# Patient Record
Sex: FEMALE | Race: WHITE | Employment: FULL TIME | URBAN - METROPOLITAN AREA
[De-identification: names, ages, dates, MRNs, and addresses within clinical notes are randomized per-mention and may not be internally consistent; named-entity substitution may affect disease eponyms.]

---

## 2017-09-13 ENCOUNTER — OFFICE VISIT (OUTPATIENT)
Dept: INTERNAL MEDICINE CLINIC | Age: 37
End: 2017-09-13

## 2017-09-13 VITALS
HEART RATE: 94 BPM | OXYGEN SATURATION: 97 % | WEIGHT: 164 LBS | DIASTOLIC BLOOD PRESSURE: 62 MMHG | SYSTOLIC BLOOD PRESSURE: 100 MMHG | RESPIRATION RATE: 18 BRPM | TEMPERATURE: 98.7 F | HEIGHT: 61 IN | BODY MASS INDEX: 30.96 KG/M2

## 2017-09-13 DIAGNOSIS — J20.9 ACUTE BRONCHITIS, UNSPECIFIED ORGANISM: Primary | ICD-10-CM

## 2017-09-13 PROCEDURE — 99213 OFFICE O/P EST LOW 20 MIN: CPT | Performed by: FAMILY MEDICINE

## 2017-09-13 RX ORDER — ALBUTEROL SULFATE 90 UG/1
2 AEROSOL, METERED RESPIRATORY (INHALATION) EVERY 6 HOURS PRN
Qty: 1 INHALER | Refills: 3 | Status: SHIPPED | OUTPATIENT
Start: 2017-09-13 | End: 2020-01-17 | Stop reason: ALTCHOICE

## 2017-09-13 RX ORDER — AMOXICILLIN 875 MG/1
875 TABLET, COATED ORAL 2 TIMES DAILY
Qty: 20 TABLET | Refills: 0 | Status: SHIPPED | OUTPATIENT
Start: 2017-09-13 | End: 2017-09-23

## 2017-09-13 RX ORDER — CETIRIZINE HYDROCHLORIDE, PSEUDOEPHEDRINE HYDROCHLORIDE 5; 120 MG/1; MG/1
1 TABLET, FILM COATED, EXTENDED RELEASE ORAL 2 TIMES DAILY
Qty: 30 TABLET | Refills: 0 | Status: SHIPPED | OUTPATIENT
Start: 2017-09-13 | End: 2017-09-28

## 2017-09-13 RX ORDER — BENZONATATE 100 MG/1
100 CAPSULE ORAL 3 TIMES DAILY PRN
Qty: 30 CAPSULE | Refills: 0 | Status: SHIPPED | OUTPATIENT
Start: 2017-09-13 | End: 2017-09-23

## 2017-09-13 ASSESSMENT — ENCOUNTER SYMPTOMS
SINUS PRESSURE: 1
VOMITING: 0
COUGH: 1
SORE THROAT: 1
DIARRHEA: 0
NAUSEA: 0
SHORTNESS OF BREATH: 1

## 2018-03-02 LAB
C TRACH DNA GENITAL QL NAA+PROBE: NEGATIVE
CHLAMYDIA TRACHOMATIS AMPLIFIED DET: NORMAL

## 2018-03-15 LAB
HEPATITIS B SURFACE ANTIGEN INTERPRETATION: NEGATIVE
HIV-1 AND HIV-2 ANTIBODIES: NEGATIVE
RUBELLA ANTIBODY IGG: NORMAL

## 2018-08-15 LAB — RPR: NORMAL

## 2018-08-30 ENCOUNTER — HOSPITAL ENCOUNTER (OUTPATIENT)
Dept: OTHER | Age: 38
Discharge: HOME | End: 2018-09-06
Attending: OBSTETRICS & GYNECOLOGY | Admitting: OBSTETRICS & GYNECOLOGY

## 2018-10-07 ENCOUNTER — HOSPITAL ENCOUNTER (INPATIENT)
Age: 38
LOS: 2 days | Discharge: HOME | End: 2018-10-09
Attending: OBSTETRICS & GYNECOLOGY | Admitting: OBSTETRICS & GYNECOLOGY
Payer: COMMERCIAL

## 2018-10-07 ENCOUNTER — HOSPITAL ENCOUNTER (OUTPATIENT)
Age: 38
Setting detail: OBSERVATION
Discharge: HOME | End: 2018-10-07
Attending: OBSTETRICS & GYNECOLOGY | Admitting: OBSTETRICS & GYNECOLOGY
Payer: COMMERCIAL

## 2018-10-07 VITALS
HEART RATE: 72 BPM | SYSTOLIC BLOOD PRESSURE: 130 MMHG | WEIGHT: 191 LBS | RESPIRATION RATE: 18 BRPM | TEMPERATURE: 98.2 F | HEIGHT: 61 IN | BODY MASS INDEX: 36.06 KG/M2 | DIASTOLIC BLOOD PRESSURE: 76 MMHG

## 2018-10-07 LAB
ABO/RH: NORMAL
AMPHETAMINE SCREEN, URINE: NORMAL
ANTIBODY SCREEN: NORMAL
BARBITURATE SCREEN URINE: NORMAL
BENZODIAZEPINE SCREEN, URINE: NORMAL
BUPRENORPHINE URINE: NORMAL
CANNABINOID SCREEN URINE: NORMAL
COCAINE METABOLITE SCREEN URINE: NORMAL
HCT VFR BLD CALC: 39.5 % (ref 36–48)
HEMOGLOBIN: 13.3 G/DL (ref 12–16)
Lab: NORMAL
MCH RBC QN AUTO: 30.1 PG (ref 26–34)
MCHC RBC AUTO-ENTMCNC: 33.7 G/DL (ref 31–36)
MCV RBC AUTO: 89.4 FL (ref 80–100)
METHADONE SCREEN, URINE: NORMAL
OPIATE SCREEN URINE: NORMAL
OXYCODONE URINE: NORMAL
PDW BLD-RTO: 13.5 % (ref 12.4–15.4)
PH UA: 5
PHENCYCLIDINE SCREEN URINE: NORMAL
PLATELET # BLD: 202 K/UL (ref 135–450)
PMV BLD AUTO: 8.7 FL (ref 5–10.5)
PROPOXYPHENE SCREEN: NORMAL
RBC # BLD: 4.42 M/UL (ref 4–5.2)
WBC # BLD: 11.9 K/UL (ref 4–11)

## 2018-10-07 PROCEDURE — 86780 TREPONEMA PALLIDUM: CPT

## 2018-10-07 PROCEDURE — 1220000000 HC SEMI PRIVATE OB R&B

## 2018-10-07 PROCEDURE — G0379 DIRECT REFER HOSPITAL OBSERV: HCPCS

## 2018-10-07 PROCEDURE — G0378 HOSPITAL OBSERVATION PER HR: HCPCS

## 2018-10-07 PROCEDURE — 96365 THER/PROPH/DIAG IV INF INIT: CPT

## 2018-10-07 PROCEDURE — 85027 COMPLETE CBC AUTOMATED: CPT

## 2018-10-07 PROCEDURE — 99999 PR OFFICE/OUTPT VISIT,PROCEDURE ONLY: CPT | Performed by: OBSTETRICS & GYNECOLOGY

## 2018-10-07 PROCEDURE — 59025 FETAL NON-STRESS TEST: CPT

## 2018-10-07 PROCEDURE — 0KQM0ZZ REPAIR PERINEUM MUSCLE, OPEN APPROACH: ICD-10-PCS | Performed by: OBSTETRICS & GYNECOLOGY

## 2018-10-07 PROCEDURE — 6360000002 HC RX W HCPCS: Performed by: OBSTETRICS & GYNECOLOGY

## 2018-10-07 PROCEDURE — 86850 RBC ANTIBODY SCREEN: CPT

## 2018-10-07 PROCEDURE — 2500000003 HC RX 250 WO HCPCS

## 2018-10-07 PROCEDURE — 80307 DRUG TEST PRSMV CHEM ANLYZR: CPT

## 2018-10-07 PROCEDURE — 2580000003 HC RX 258: Performed by: OBSTETRICS & GYNECOLOGY

## 2018-10-07 PROCEDURE — 86900 BLOOD TYPING SEROLOGIC ABO: CPT

## 2018-10-07 PROCEDURE — 86901 BLOOD TYPING SEROLOGIC RH(D): CPT

## 2018-10-07 PROCEDURE — 99205 OFFICE O/P NEW HI 60 MIN: CPT

## 2018-10-07 RX ORDER — LIDOCAINE HYDROCHLORIDE 10 MG/ML
INJECTION, SOLUTION INFILTRATION; PERINEURAL
Status: COMPLETED
Start: 2018-10-07 | End: 2018-10-07

## 2018-10-07 RX ORDER — SODIUM CHLORIDE 0.9 % (FLUSH) 0.9 %
10 SYRINGE (ML) INJECTION PRN
Status: DISCONTINUED | OUTPATIENT
Start: 2018-10-07 | End: 2018-10-08 | Stop reason: HOSPADM

## 2018-10-07 RX ORDER — SODIUM CHLORIDE, SODIUM LACTATE, POTASSIUM CHLORIDE, CALCIUM CHLORIDE 600; 310; 30; 20 MG/100ML; MG/100ML; MG/100ML; MG/100ML
INJECTION, SOLUTION INTRAVENOUS CONTINUOUS
Status: DISCONTINUED | OUTPATIENT
Start: 2018-10-07 | End: 2018-10-09 | Stop reason: HOSPADM

## 2018-10-07 RX ORDER — SODIUM CHLORIDE 0.9 % (FLUSH) 0.9 %
10 SYRINGE (ML) INJECTION EVERY 12 HOURS SCHEDULED
Status: DISCONTINUED | OUTPATIENT
Start: 2018-10-07 | End: 2018-10-08 | Stop reason: HOSPADM

## 2018-10-07 RX ORDER — ONDANSETRON 2 MG/ML
4 INJECTION INTRAMUSCULAR; INTRAVENOUS EVERY 6 HOURS PRN
Status: DISCONTINUED | OUTPATIENT
Start: 2018-10-07 | End: 2018-10-08 | Stop reason: HOSPADM

## 2018-10-07 RX ORDER — DOCUSATE SODIUM 100 MG/1
100 CAPSULE, LIQUID FILLED ORAL 2 TIMES DAILY
Status: DISCONTINUED | OUTPATIENT
Start: 2018-10-07 | End: 2018-10-08 | Stop reason: HOSPADM

## 2018-10-07 RX ADMIN — DEXTROSE MONOHYDRATE 5 MILLION UNITS: 5 INJECTION INTRAVENOUS at 19:52

## 2018-10-07 RX ADMIN — SODIUM CHLORIDE, POTASSIUM CHLORIDE, SODIUM LACTATE AND CALCIUM CHLORIDE: 600; 310; 30; 20 INJECTION, SOLUTION INTRAVENOUS at 20:54

## 2018-10-07 RX ADMIN — Medication 1 MILLI-UNITS/MIN: at 21:00

## 2018-10-07 RX ADMIN — SODIUM CHLORIDE, POTASSIUM CHLORIDE, SODIUM LACTATE AND CALCIUM CHLORIDE: 600; 310; 30; 20 INJECTION, SOLUTION INTRAVENOUS at 19:21

## 2018-10-07 RX ADMIN — LIDOCAINE HYDROCHLORIDE 200 MG: 10 INJECTION, SOLUTION INFILTRATION; PERINEURAL at 21:00

## 2018-10-07 NOTE — FLOWSHEET NOTE
Pt arrived to triage C with complaints of fluid leaking- pt states she went threw three panty liners today. No bleeding noted at this time. Pt states fluid was brown at first, now pink tinged. Pt placed on monitors. audible fhts 130's . Pt states she has felt her baby move today. Dr sexton called to exam pt - dr sexton on way now.

## 2018-10-07 NOTE — FLOWSHEET NOTE
Transferred to room 2286    Dr Ferny Garcias called and updated- on her way now. Ob tech alitta at bedside to set up table now. Mark crna called to update on status- mark has one more epi to place then coming into this room- pt aware.  and nurse helping pt with breathing and support.     Tracing reactive

## 2018-10-08 LAB — TOTAL SYPHILLIS IGG/IGM: NORMAL

## 2018-10-08 PROCEDURE — 59025 FETAL NON-STRESS TEST: CPT

## 2018-10-08 PROCEDURE — 6360000002 HC RX W HCPCS: Performed by: OBSTETRICS & GYNECOLOGY

## 2018-10-08 PROCEDURE — 90686 IIV4 VACC NO PRSV 0.5 ML IM: CPT | Performed by: OBSTETRICS & GYNECOLOGY

## 2018-10-08 PROCEDURE — 7200000001 HC VAGINAL DELIVERY

## 2018-10-08 PROCEDURE — 1220000000 HC SEMI PRIVATE OB R&B

## 2018-10-08 PROCEDURE — G0008 ADMIN INFLUENZA VIRUS VAC: HCPCS | Performed by: OBSTETRICS & GYNECOLOGY

## 2018-10-08 PROCEDURE — 6370000000 HC RX 637 (ALT 250 FOR IP): Performed by: OBSTETRICS & GYNECOLOGY

## 2018-10-08 RX ORDER — LANOLIN 100 %
OINTMENT (GRAM) TOPICAL PRN
Status: DISCONTINUED | OUTPATIENT
Start: 2018-10-08 | End: 2018-10-09 | Stop reason: HOSPADM

## 2018-10-08 RX ORDER — SODIUM CHLORIDE 0.9 % (FLUSH) 0.9 %
10 SYRINGE (ML) INJECTION EVERY 12 HOURS SCHEDULED
Status: DISCONTINUED | OUTPATIENT
Start: 2018-10-08 | End: 2018-10-09 | Stop reason: HOSPADM

## 2018-10-08 RX ORDER — LANOLIN 100 %
OINTMENT (GRAM) TOPICAL
Qty: 1 TUBE | Refills: 3 | COMMUNITY
Start: 2018-10-08 | End: 2021-07-11

## 2018-10-08 RX ORDER — ACETAMINOPHEN 325 MG/1
650 TABLET ORAL EVERY 4 HOURS PRN
Qty: 120 TABLET | Refills: 3 | COMMUNITY
Start: 2018-10-08 | End: 2021-07-11

## 2018-10-08 RX ORDER — SODIUM CHLORIDE, SODIUM LACTATE, POTASSIUM CHLORIDE, CALCIUM CHLORIDE 600; 310; 30; 20 MG/100ML; MG/100ML; MG/100ML; MG/100ML
INJECTION, SOLUTION INTRAVENOUS CONTINUOUS
Status: DISCONTINUED | OUTPATIENT
Start: 2018-10-08 | End: 2018-10-09 | Stop reason: HOSPADM

## 2018-10-08 RX ORDER — IBUPROFEN 400 MG/1
800 TABLET ORAL EVERY 8 HOURS
Status: DISCONTINUED | OUTPATIENT
Start: 2018-10-08 | End: 2018-10-09 | Stop reason: HOSPADM

## 2018-10-08 RX ORDER — HYDROCODONE BITARTRATE AND ACETAMINOPHEN 5; 325 MG/1; MG/1
1 TABLET ORAL EVERY 6 HOURS PRN
Status: ACTIVE | OUTPATIENT
Start: 2018-10-08 | End: 2018-10-09

## 2018-10-08 RX ORDER — ACETAMINOPHEN 325 MG/1
650 TABLET ORAL EVERY 4 HOURS PRN
Status: DISCONTINUED | OUTPATIENT
Start: 2018-10-08 | End: 2018-10-09 | Stop reason: HOSPADM

## 2018-10-08 RX ORDER — IBUPROFEN 800 MG/1
800 TABLET ORAL EVERY 8 HOURS PRN
Qty: 120 TABLET | Refills: 3 | COMMUNITY
Start: 2018-10-08 | End: 2020-10-04

## 2018-10-08 RX ORDER — DOCUSATE SODIUM 100 MG/1
100 CAPSULE, LIQUID FILLED ORAL 2 TIMES DAILY
Status: DISCONTINUED | OUTPATIENT
Start: 2018-10-08 | End: 2018-10-09 | Stop reason: HOSPADM

## 2018-10-08 RX ORDER — SODIUM CHLORIDE 0.9 % (FLUSH) 0.9 %
10 SYRINGE (ML) INJECTION PRN
Status: DISCONTINUED | OUTPATIENT
Start: 2018-10-08 | End: 2018-10-09 | Stop reason: HOSPADM

## 2018-10-08 RX ADMIN — IBUPROFEN 800 MG: 400 TABLET, FILM COATED ORAL at 08:16

## 2018-10-08 RX ADMIN — BENZOCAINE AND LEVOMENTHOL: 200; 5 SPRAY TOPICAL at 00:00

## 2018-10-08 RX ADMIN — IBUPROFEN 800 MG: 400 TABLET, FILM COATED ORAL at 00:47

## 2018-10-08 RX ADMIN — DOCUSATE SODIUM 100 MG: 100 CAPSULE, LIQUID FILLED ORAL at 08:16

## 2018-10-08 RX ADMIN — DOCUSATE SODIUM 100 MG: 100 CAPSULE, LIQUID FILLED ORAL at 21:52

## 2018-10-08 RX ADMIN — IBUPROFEN 800 MG: 400 TABLET, FILM COATED ORAL at 15:35

## 2018-10-08 RX ADMIN — INFLUENZA A VIRUS A/MICHIGAN/45/2015 X-275 (H1N1) ANTIGEN (FORMALDEHYDE INACTIVATED), INFLUENZA A VIRUS A/SINGAPORE/INFIMH-16-0019/2016 IVR-186 (H3N2) ANTIGEN (FORMALDEHYDE INACTIVATED), INFLUENZA B VIRUS B/PHUKET/3073/2013 ANTIGEN (FORMALDEHYDE INACTIVATED), AND INFLUENZA B VIRUS B/MARYLAND/15/2016 BX-69A ANTIGEN (FORMALDEHYDE INACTIVATED) 0.5 ML: 15; 15; 15; 15 INJECTION, SUSPENSION INTRAMUSCULAR at 13:00

## 2018-10-08 RX ADMIN — ACETAMINOPHEN 650 MG: 325 TABLET, FILM COATED ORAL at 21:53

## 2018-10-08 ASSESSMENT — PAIN SCALES - GENERAL
PAINLEVEL_OUTOF10: 4
PAINLEVEL_OUTOF10: 2
PAINLEVEL_OUTOF10: 4
PAINLEVEL_OUTOF10: 3

## 2018-10-08 NOTE — PLAN OF CARE
Problem: Fluid Volume - Imbalance:  Goal: Absence of postpartum hemorrhage signs and symptoms  Absence of postpartum hemorrhage signs and symptoms   Outcome: Ongoing  No s/s    Problem: Pain:  Goal: Control of acute pain  Control of acute pain   Outcome: Ongoing      Problem: Constipation:  Goal: Bowel elimination is within specified parameters  Bowel elimination is within specified parameters   Outcome: Ongoing  Colace given

## 2018-10-08 NOTE — FLOWSHEET NOTE
Report received, RN at bedside to meet patient. Whiteboard updated. Denies pain now, preparing to eat dinner. Planning to pump for 15 minutes after dinner. Infant asleep in crib. Call light in reach.

## 2018-10-08 NOTE — LACTATION NOTE
This note was copied from a baby's chart. LC called to room to assist with feeding. Mother hand expressed one large drop of colostrum to infant. Infant sleepy and disinterested in feeding at this time. Infant will suck on gloved finger for short burst. Suggested to try to burp infant before feedings, infant burped well. Infant latched briefly to mother's right breast with a few suck burst before falling asleep. Encouraged mother to continue doing skin to skin and attempting whenever infant is showing cues and at least every 3 hours. Encouraged mother to continue practicing hand expression as well. Discussed cluster feeding with mother and encouraged mother to get rest today in case infant wakes later tonight to cluster feed. Mother states understanding of all information and denies further needs at this time.
infant had jaundice and was supplemented early on. The mother requests I initiate process for a breast pump through insurance. I faxed insurance information and prescription for breast pump to [facility]. I wrote my name and circled the phone number on patient's whiteboard, provided a lactation consultant business card, directed mother to [facility] for evidence based information, and encouraged mother to call for a feeding. Response: Mother verbalizes understanding of information given and denies further needs at this time. Mother states will call for next feeding.

## 2018-10-09 VITALS
HEART RATE: 78 BPM | SYSTOLIC BLOOD PRESSURE: 109 MMHG | DIASTOLIC BLOOD PRESSURE: 69 MMHG | OXYGEN SATURATION: 96 % | RESPIRATION RATE: 18 BRPM | TEMPERATURE: 98 F

## 2018-10-09 PROCEDURE — 6370000000 HC RX 637 (ALT 250 FOR IP): Performed by: OBSTETRICS & GYNECOLOGY

## 2018-10-09 RX ADMIN — IBUPROFEN 800 MG: 400 TABLET, FILM COATED ORAL at 12:36

## 2018-10-09 RX ADMIN — DOCUSATE SODIUM 100 MG: 100 CAPSULE, LIQUID FILLED ORAL at 08:12

## 2018-10-09 RX ADMIN — IBUPROFEN 800 MG: 400 TABLET, FILM COATED ORAL at 03:37

## 2018-10-09 ASSESSMENT — PAIN DESCRIPTION - LOCATION: LOCATION: ABDOMEN

## 2018-10-09 ASSESSMENT — PAIN SCALES - GENERAL
PAINLEVEL_OUTOF10: 3
PAINLEVEL_OUTOF10: 1
PAINLEVEL_OUTOF10: 5

## 2018-10-09 ASSESSMENT — PAIN DESCRIPTION - PAIN TYPE: TYPE: ACUTE PAIN

## 2018-10-09 ASSESSMENT — PAIN DESCRIPTION - DESCRIPTORS: DESCRIPTORS: CRAMPING

## 2018-10-09 NOTE — FLOWSHEET NOTE
Assessment completed, as charted. VSS, no distress noted. Fundus firm and midline, lochia rubra small. Voiding without difficulty. Using nipple shield with breastfeeding, assistance provided as needed. Call light in reach.

## 2018-10-09 NOTE — FLOWSHEET NOTE
Pt eugenie to go home tonight. Dr Bárbara Franco updated and aware of ped appointment tomorrow at 1120am with circumcision on Thursday at 1110am. MD owusu infant d/c home.

## 2020-01-17 ENCOUNTER — OFFICE VISIT (OUTPATIENT)
Dept: BARIATRICS/WEIGHT MGMT | Age: 40
End: 2020-01-17
Payer: COMMERCIAL

## 2020-01-17 VITALS
RESPIRATION RATE: 18 BRPM | WEIGHT: 169.2 LBS | HEIGHT: 61 IN | BODY MASS INDEX: 31.95 KG/M2 | HEART RATE: 64 BPM | DIASTOLIC BLOOD PRESSURE: 80 MMHG | SYSTOLIC BLOOD PRESSURE: 124 MMHG

## 2020-01-17 PROBLEM — E66.01 SEVERE OBESITY (BMI 35.0-39.9) WITH COMORBIDITY (HCC): Status: ACTIVE | Noted: 2020-01-17

## 2020-01-17 PROBLEM — E78.2 MIXED HYPERLIPIDEMIA: Status: ACTIVE | Noted: 2020-01-17

## 2020-01-17 PROBLEM — E66.9 OBESITY (BMI 30.0-34.9): Status: ACTIVE | Noted: 2020-01-17

## 2020-01-17 PROCEDURE — 99204 OFFICE O/P NEW MOD 45 MIN: CPT | Performed by: SURGERY

## 2020-01-17 NOTE — PROGRESS NOTES
Elijah Hoff is a 42 y.o. female with a date of birth of 1980. Vitals:    01/17/20 1038   BP: 124/80   Pulse: 64   Resp: 18   Weight: 169 lb 3.2 oz (76.7 kg)   Height: 5' 1\" (1.549 m)    BMI: Body mass index is 31.97 kg/m². Obesity Classification: Class II    Weight History: Wt Readings from Last 3 Encounters:   01/17/20 169 lb 3.2 oz (76.7 kg)   10/07/18 191 lb (86.6 kg)   04/15/18 178 lb 12.7 oz (81.1 kg)     Patient's lowest adult weight was 110 lb at age early 21's. Patient's highest adult weight was 170 lb at age 42 -current. Patient has participated in the following weight loss programs: , calorie restriction and Metabol[employer]/advocare. Patient has not participated in meal replacement/liquid diets. Patient has participated in weight loss medications. Adipex    Patient is not lactose intolerant. Patient does not have Catholic/cultural food concerns. Patient does not have food allergies. Patient dines out to a sit down restaurant 1-2 times per month. Patient dines out to a fast food restaurant 1-2 times per week. 24 hour recall/food frequency chart:  Breakfast: yes. 8:30 AM: Rice cake w/ PB + 8 oz 1% milk  Snack: yes. 10 AM: Almonds + cracker  Lunch: yes. 1 PM: 2 turkey ying + broccoli w/ butter + diet coke  Snack: yes. 3 PM: mini chocolate bar + jalepeno cheese balls + sf monster drink  Dinner: yes. 7:30 PM: Grilled chix teriyaki w/ chix fried rice + diet coke  Snack: yes. 9:30 PM: Cereal + 2 cookies  Drinks throughout the day:  Water, diet coke, SF Monster occasionally    Do you drink alcohol? yes. How often/how much alcohol do you drink: 3-4 Beers OR 3-4 mixed drinks per week. Patient does meet the criteria for binge eating disorder. Patient does have grazing. Patient does not have night eating. Patient does have a history of emotional eating/stress or eating out of boredom.      It does not take an unusually large amount of food for the patient to feel comfortably full. Most days the patient eats until she is satisfied- full. Patient describes level of activity as ADL. Goals  Weight: 140 lbs  Health Improvement: improve cholesterol, play w/ Kids -increase stamina    Assessment  Nutritional Needs: RMR=(9.99 x 76.7) + (6.25 x 154.9) - (4.92 x 39 y.o.) -161  = 1391 kcal x 1.4 (sedenary activity factor)= 1947 kcal - 500 (for 1 lb weight loss/week)= 1447 kcal.    Plan  Plan/Recommendations:   - Start 1200 calorie, low carb meal plan  - Track with MyFitnessPal    Optifast:  Not interested  Diet Medications: May be interested     PES Statement:  Overweight/Obesity related to increased caloric intake and decreased physical activity as evidenced by BMI. Body mass index is 31.97 kg/m². .    Will follow up as necessary.     Radha Harris

## 2020-01-17 NOTE — PATIENT INSTRUCTIONS
Patient received dietary handouts and education.     Plan  Plan/Recommendations:   - Start 1200 calorie, low carb meal plan  - Track with MyFitnessPal

## 2020-09-16 ENCOUNTER — HOSPITAL ENCOUNTER (EMERGENCY)
Age: 40
Discharge: HOME | End: 2020-09-16
Attending: EMERGENCY MEDICINE
Payer: COMMERCIAL

## 2020-09-16 VITALS
WEIGHT: 177.69 LBS | OXYGEN SATURATION: 98 % | SYSTOLIC BLOOD PRESSURE: 145 MMHG | TEMPERATURE: 98.2 F | DIASTOLIC BLOOD PRESSURE: 71 MMHG | HEART RATE: 89 BPM | BODY MASS INDEX: 33.57 KG/M2 | RESPIRATION RATE: 18 BRPM

## 2020-09-16 PROCEDURE — 99282 EMERGENCY DEPT VISIT SF MDM: CPT

## 2020-09-16 PROCEDURE — 6360000002 HC RX W HCPCS: Performed by: PHYSICIAN ASSISTANT

## 2020-09-16 PROCEDURE — 96372 THER/PROPH/DIAG INJ SC/IM: CPT

## 2020-09-16 RX ORDER — FAMOTIDINE 20 MG/1
20 TABLET, FILM COATED ORAL 2 TIMES DAILY
Qty: 14 TABLET | Refills: 0 | Status: SHIPPED | OUTPATIENT
Start: 2020-09-16 | End: 2021-07-11

## 2020-09-16 RX ORDER — CLINDAMYCIN HYDROCHLORIDE 150 MG/1
450 CAPSULE ORAL 3 TIMES DAILY
Qty: 90 CAPSULE | Refills: 0 | Status: SHIPPED | OUTPATIENT
Start: 2020-09-16 | End: 2020-09-26

## 2020-09-16 RX ORDER — CETIRIZINE HYDROCHLORIDE 10 MG/1
10 TABLET ORAL DAILY
Qty: 7 TABLET | Refills: 0 | Status: SHIPPED | OUTPATIENT
Start: 2020-09-16 | End: 2020-09-23

## 2020-09-16 RX ORDER — METHYLPREDNISOLONE SODIUM SUCCINATE 125 MG/2ML
125 INJECTION, POWDER, LYOPHILIZED, FOR SOLUTION INTRAMUSCULAR; INTRAVENOUS ONCE
Status: COMPLETED | OUTPATIENT
Start: 2020-09-16 | End: 2020-09-16

## 2020-09-16 RX ORDER — METHYLPREDNISOLONE 4 MG/1
4 TABLET ORAL SEE ADMIN INSTRUCTIONS
Qty: 1 KIT | Refills: 0 | Status: SHIPPED | OUTPATIENT
Start: 2020-09-16 | End: 2020-09-22

## 2020-09-16 RX ADMIN — METHYLPREDNISOLONE SODIUM SUCCINATE 125 MG: 125 INJECTION, POWDER, FOR SOLUTION INTRAMUSCULAR; INTRAVENOUS at 13:00

## 2020-09-16 ASSESSMENT — PAIN SCALES - GENERAL
PAINLEVEL_OUTOF10: 4
PAINLEVEL_OUTOF10: 4

## 2020-09-16 ASSESSMENT — PAIN DESCRIPTION - DESCRIPTORS: DESCRIPTORS: ITCHING

## 2020-09-16 ASSESSMENT — ENCOUNTER SYMPTOMS
DIARRHEA: 0
ROS SKIN COMMENTS: +ITCHING
NAUSEA: 0
VOMITING: 0
SHORTNESS OF BREATH: 0

## 2020-09-16 ASSESSMENT — PAIN DESCRIPTION - LOCATION: LOCATION: OTHER (COMMENT)

## 2020-09-16 ASSESSMENT — PAIN DESCRIPTION - ONSET: ONSET: ON-GOING

## 2020-09-16 ASSESSMENT — PAIN DESCRIPTION - PROGRESSION: CLINICAL_PROGRESSION: GRADUALLY WORSENING

## 2020-09-16 ASSESSMENT — PAIN DESCRIPTION - FREQUENCY: FREQUENCY: INTERMITTENT

## 2020-09-16 ASSESSMENT — PAIN DESCRIPTION - PAIN TYPE: TYPE: ACUTE PAIN

## 2020-09-16 NOTE — ED NOTES
D/C: Order noted for d/c. Pt confirmed d/c paperwork and 4 rx have correct name. Discharge and education instructions reviewed with patient. Teach-back successful. Pt verbalized understanding and signed d/c papers. Pt denied questions at this time. No acute distress noted. Patient instructed to follow-up as noted - return to emergency department if symptoms worsen. Patient verbalized understanding. Discharged per EDMD with discharge instructions. Pt discharged to private vehicle. Patient stable upon departure. Thanked patient for choosing [facility] for care. Provider aware of patient pain at time of discharge.      Liliana Acuna, RN  09/16/20 9977

## 2020-09-16 NOTE — ED PROVIDER NOTES
Attending Note:    The patient was seen and examined by the mid-level provider. I also completed a face-to-face examination. HPI: Guerda Garcia is a 47 y.o. female who presents to the emergency department with a complaint of a rash that initially began 2 weeks ago. The patient states that 24 hours prior to the onset of the rash, she was climbing out of a window, and fell into some bushes below the window. She states that the area is covered with poison ivy and poison ivy mable. She sustained some abrasions on her left forearm at the time of the injury when she grabbed the mable. She states that she gets poison ivy every year and this is identical to what she is experienced in the past with poison ivy. She describes a severely pruritic rash that now involves her bilateral forearms, left thigh, and abdomen. Several days later she went to the [facility] and was given a Kenalog shot. She was seen in urgent care the next day and was prescribed prednisone 20 mg twice daily but reports no improvement. In fact, she states that it was initially just on her left arm followed by her right arm but is now on her left thigh and abdomen. She denies any continual exposure but states that she has been petting her dog who is frequently in the bushes all the time. She denies any fever or chills. No cough or cold symptoms. She does report that the urgent care center prescribed Bactrim 6 days ago. No prior history of allergy to sulfa or Bactrim. She is not pregnant. She denies any fever chills nausea vomiting or diarrhea. No headache. No oral lesions. Physical Exam:     Constitutional: No apparent distress. Very pleasant. Nontoxic. Head: No visible evidence of trauma. Normocephalic. Eyes: Pupils equal and reactive. No photophobia. Conjunctiva normal.  No orbital involvement of the rash. HENT: Oral mucosa moist.  Airway patent. No intraoral lesions.   Tongue normal.  Posterior pharynx normal.  Lips normal.  Abdomen:  Soft, non-distended. Nontender. No guarding rigidity or rebound. Musculoskeletal: Extremities non-tender with full range of motion. Neurological: Alert and oriented x 3. Speech clear. No acute focal motor or sensory deficits. Skin: Skin is warm and dry. There is a broad-based erythematous slightly raised cobblestone rash with intermittent vesicles. Some of these are arranged in linear streaks. Rashes consistent with contact dermatitis/poison ivy. No purulent drainage. No evidence of secondary infection. Rash involves primarily the left forearm on the volar surface, dorsal aspect of the right forearm, left thigh which also contain some large fluid-filled clear vesicles. There is also some small scattered patches of erythema to the face, chin, and abdominal wall. Psychiatric: Normal mood and affect. Behavior is normal.     DIAGNOSTIC RESULTS     EKG: All EKG's are interpreted by the Emergency Department Physician who either signs or Co-signs this chart in the absence of a cardiologist.        RADIOLOGY:   Non-plain film images such as CT, Ultrasound and MRI are read by the radiologist. Plain radiographic images are visualized and preliminarily interpreted by the emergency physician with the below findings:        Interpretation per the Radiologist below, if available at the time of this note:    No orders to display         ED BEDSIDE ULTRASOUND:   Performed by ED Physician - none    LABS:  Labs Reviewed - No data to display    All other labs were within normal range or not returned as of this dictation. EMERGENCY DEPARTMENT COURSE and DIFFERENTIAL DIAGNOSIS/MDM:   Vitals:    Vitals:    09/16/20 1012 09/16/20 1330   BP: (!) 150/77 (!) 145/71   Pulse: 98 89   Resp: 18 18   Temp: 98 °F (36.7 °C) 98.2 °F (36.8 °C)   TempSrc: Oral    SpO2: 97% 98%   Weight: 177 lb 11.1 oz (80.6 kg)        The patient presents with rash as noted above.   This is consistent with contact dermatitis likely secondary to poison ivy. Mechanism of injury and exposure is consistent with poison ivy and I suspect that this may have been a significant exposure based on the fact that she grabbed mable and was immersed in the poison ivy from head to toe when she fell. She may also have some re-exposure given the fact that she was wearing shoes and other articles of clothing at the time which has not subsequently been washed. I advised her to wash all of her clothing in warm soapy water, take cool showers, do not scratch. She will be given Solu-Medrol here we will change her over to a Medrol Dosepak. We will discontinue Bactrim in the event that there is any type of adverse reaction but this is thought to be very unlikely. No evidence of Whalen-Angel syndrome. No oral lesions. No orbital involvement. She will also be placed on Pepcid. MDM      CRITICAL CARE TIME   Total Critical Care time was 0 minutes, excluding separately reportable procedures. There was a high probability of clinically significant/life threatening deterioration in the patient's condition which required my urgent intervention. CONSULTS:  None    PROCEDURES:  Unless otherwise noted below, none     Procedures        FINAL IMPRESSION      1.  Contact dermatitis due to poison ivy          DISPOSITION/PLAN   DISPOSITION        PATIENT REFERRED TO:  Manju Carter, APRN - CNP  una [facility] [address] [place] [address]  [phone]    Schedule an appointment as soon as possible for a visit in 2 days  for reevaluation    [facility]  [address] [facility]  [phone]    As needed, If symptoms worsen      DISCHARGE MEDICATIONS:  Discharge Medication List as of 9/16/2020  1:03 PM      START taking these medications    Details   methylPREDNISolone (MEDROL, BLAKE,) 4 MG tablet Take 1 tablet by mouth See Admin Instructions for 6 days Take by mouth., Disp-1 kit,R-0Print clindamycin (CLEOCIN) 150 MG capsule Take 3 capsules by mouth 3 times daily for 10 days, Disp-90 capsule,R-0Print      famotidine (PEPCID) 20 MG tablet Take 1 tablet by mouth 2 times daily for 7 days, Disp-14 tablet,R-0Print      cetirizine (ZYRTEC) 10 MG tablet Take 1 tablet by mouth daily for 7 days, Disp-7 tablet,R-0Print           Controlled Substances Monitoring:     No flowsheet data found. (Please note that portions of this note were completed with a voice recognition program.  Efforts were made to edit the dictations but occasionally words are mis-transcribed. )    9145 Srinivasan Montelongo DO (electronically signed)  Attending Emergency Physician      Melinda Arreguin DO  09/16/20 9405

## 2020-09-16 NOTE — ED TRIAGE NOTES
Pt arrived to ED via private vehicle with complaints of poison ivy. On initial assessment, appears all over body, pt was trying to get into their window due to locking themself out, fell, cut arm, believes fell into poison ivy, thinks got into cut and is speading throughout body. VS noted and stable. Patient A&Ox4. Respirations easy and unlabored. Skin warm and dry and appropriate for ethnicity. No acute distress noted at this time.

## 2020-09-16 NOTE — ED PROVIDER NOTES
she has taken Bactrim before. Denies recent travel or being in the woods. Nursing Notes were reviewed and I agree. OF SYSTEMS    (2-9 systems for level 4, 10 or more for level 5)     Review of Systems   Constitutional: Positive for chills. HENT:        Neg throat swelling   Respiratory: Negative for shortness of breath. Gastrointestinal: Negative for diarrhea, nausea and vomiting. Skin: Positive for rash. +itching     Except as noted above the remainder of the review of systems was reviewed and negative. PAST MEDICAL HISTORY         Diagnosis Date    Anxiety     Diabetes mellitus ([facility] [address]     GDM-glyburide    Mixed hyperlipidemia 1/17/2020    Mixed hyperlipidemia     Pyelonephritis 4/7/2016    Severe obesity (BMI 35.0-39. 9) with comorbidity ([facility] [address] 1/17/2020       SURGICAL HISTORY         Procedure Laterality Date    WISDOM TOOTH EXTRACTION         CURRENT MEDICATIONS       Discharge Medication List as of 9/16/2020  1:03 PM      CONTINUE these medications which have NOT CHANGED    Details   acetaminophen (TYLENOL) 325 MG tablet Take 2 tablets by mouth every 4 hours as needed for Pain, Disp-120 tablet, R-3OTC      ibuprofen (ADVIL;MOTRIN) 800 MG tablet Take 1 tablet by mouth every 8 hours as needed for Pain, Disp-120 tablet, R-3OTC      benzocaine-menthol (DERMOPLAST) 20-0.5 % AERO spray Apply topically as needed for Pain, Topical, PRN Starting Mon 10/8/2018, R-0, OTC      lanolin ointment Disp-1 Tube, R-3, OTCApply topically as needed. Multiple Vitamin (DAILY MULTIVITAMIN PO) Take 1 capsule by mouth daily.              ALLERGIES     No known allergies    FAMILY HISTORY           Problem Relation Age of Onset    Cancer Paternal Grandfather         lung    Diabetes Paternal Grandfather     Diabetes Maternal Cousin     High Blood Pressure Mother     High Cholesterol Mother     Osteoporosis Mother     High Blood Pressure Father     Diabetes Father     Alcohol Abuse Brother  Osteoporosis Maternal Grandmother     Early Death Maternal Grandfather     Cancer Maternal Grandfather      Family Status   Relation Name Status    PGF  (Not Specified)    MCousin  (Not Specified)    Mother  Alive    Father  Alive    Brother  (Not Specified)    MGM  (Not Specified)    MGF  (Not Specified)        SOCIAL HISTORY      reports that she quit smoking about 2 years ago. Her smoking use included cigarettes. She has a 5.00 pack-year smoking history. She has never used smokeless tobacco. She reports that she does not drink alcohol or use drugs. PHYSICAL EXAM    (up to 7 for level 4, 8 or more for level 5)     ED Triage Vitals [09/16/20 1012]   BP Temp Temp Source Pulse Resp SpO2 Height Weight   (!) 150/77 98 °F (36.7 °C) Oral 98 18 97 % -- 177 lb 11.1 oz (80.6 kg)       Physical Exam  Constitutional:       General: She is not in acute distress. Appearance: Normal appearance. She is not ill-appearing, toxic-appearing or diaphoretic. HENT:      Head: Normocephalic and atraumatic. Mouth/Throat:      Mouth: Mucous membranes are moist.      Comments: No lesions in mouth or lips   Eyes:      Comments: Right eye with mild erythema and edema around in. Conjunctiva appears normal   Neck:      Musculoskeletal: Normal range of motion and neck supple. Cardiovascular:      Rate and Rhythm: Normal rate and regular rhythm. Heart sounds: Normal heart sounds. Pulmonary:      Effort: Pulmonary effort is normal. No respiratory distress. Breath sounds: Normal breath sounds. No stridor. No wheezing. Skin:     General: Skin is warm. Comments: +erythematous excoriated rash on the face, chest, abdomen, left hip, bilateral arms. Area on the arms appears dry and very excoriated. Has a erythematous patch on the anterior upper left thigh with multiple small to moderate sized bulla centrally filled with serous appearing fluid. Rash is nontender.      No rash on palms or soles bilaterally. No petechiae ulceration or skin sloughing. No mucous membrane involvement. Neurological:      Mental Status: She is alert. Psychiatric:         Mood and Affect: Mood normal.         Behavior: Behavior normal.         Thought Content: Thought content normal.         Judgment: Judgment normal.         DIFFERENTIAL DIAGNOSIS   Contact dermatitis, Whalen-Angel syndrome, cellulitis, tinea, other, Lyme disease    DIAGNOSTICRESULTS         RADIOLOGY:   Non-plain film images such as CT, Ultrasound and MRI are read by the radiologist. Plain radiographic images are visualized and preliminarily interpreted by PRANEETH Miller with the below findings:      Interpretation per the Radiologist below, if available at the time of this note:    No orders to display         LABS:  No results found for this visit on 09/16/20. All other labs were within normal range or not returned as of this dictation. EMERGENCY DEPARTMENT COURSE and DIFFERENTIALDIAGNOSIS/MDM:   Vitals:    Vitals:    09/16/20 1012 09/16/20 1330   BP: (!) 150/77 (!) 145/71   Pulse: 98 89   Resp: 18 18   Temp: 98 °F (36.7 °C) 98.2 °F (36.8 °C)   TempSrc: Oral    SpO2: 97% 98%   Weight: 177 lb 11.1 oz (80.6 kg)        Patient wasnontoxic, well appearing, afebrile with normal vital signs exception of hypertension. Saturating well on room air. With her being on Bactrim, Whalen-Angel syndrome is in the differential.  However her rash is not painful. There is no mucous membrane involvement on exam now. Appears to have a bad case of contact dermatitis from poison ivy. It is interesting though that she developed a new area on her thigh when she does not think she was exposed again. Does report though that her dog goes into those bushes so do could be reexposing her.   Will stop the bactrim and switch to Clinda because she does have a good amount of erythema on her forearms which is probably inflammatory but will cover just in case she has developed a secondary bacterial infection. She has completed the other steroid. Will dc iwht medrol dosepak ,pepcid, and allegra. Instructed to wash dog and anything that may have come had contact with poison ivy. Suspect she is getting reexposed to it, directed to follow-up with PCP in next few days for reevaluation return for worsening. Agreed understood. I have a low suspicion that this is Katha Wen syndrome        PROCEDURES:  None    FINAL IMPRESSION      1.  Contact dermatitis due to poison ivy        DISPOSITION/PLAN   DISPOSITION Decision To Discharge 09/16/2020 12:50:14 PM      PATIENT REFERRED TO:  EVE Duque - DOLLY  [address] [facility]  [phone]    Schedule an appointment as soon as possible for a visit in 2 days  for reevaluation    [facility] Emergency Department  [address] [facility]  [phone]    As needed, If symptoms worsen      DISCHARGE MEDICATIONS:  Discharge Medication List as of 9/16/2020  1:03 PM      START taking these medications    Details   methylPREDNISolone (MEDROL, BLAKE,) 4 MG tablet Take 1 tablet by mouth See Admin Instructions for 6 days Take by mouth., Disp-1 kit,R-0Print      clindamycin (CLEOCIN) 150 MG capsule Take 3 capsules by mouth 3 times daily for 10 days, Disp-90 capsule,R-0Print      famotidine (PEPCID) 20 MG tablet Take 1 tablet by mouth 2 times daily for 7 days, Disp-14 tablet,R-0Print      cetirizine (ZYRTEC) 10 MG tablet Take 1 tablet by mouth daily for 7 days, Disp-7 tablet,R-0Print             (Please note that portions ofthis note were completed with a voice recognition program.  Efforts were made to edit the dictations but occasionally words are mis-transcribed.)    Christ Hopkins, [address] [place] [address], [address] [place] [address]  09/16/20 0649

## 2020-10-04 ENCOUNTER — HOSPITAL ENCOUNTER (EMERGENCY)
Age: 40
Discharge: HOME | End: 2020-10-04
Payer: COMMERCIAL

## 2020-10-04 ENCOUNTER — APPOINTMENT (OUTPATIENT)
Dept: GENERAL RADIOLOGY | Age: 40
End: 2020-10-04
Payer: COMMERCIAL

## 2020-10-04 VITALS
BODY MASS INDEX: 33.67 KG/M2 | WEIGHT: 178.35 LBS | OXYGEN SATURATION: 98 % | TEMPERATURE: 98.1 F | HEART RATE: 99 BPM | RESPIRATION RATE: 16 BRPM | SYSTOLIC BLOOD PRESSURE: 145 MMHG | DIASTOLIC BLOOD PRESSURE: 86 MMHG | HEIGHT: 61 IN

## 2020-10-04 PROCEDURE — 73130 X-RAY EXAM OF HAND: CPT

## 2020-10-04 PROCEDURE — 99283 EMERGENCY DEPT VISIT LOW MDM: CPT

## 2020-10-04 PROCEDURE — 73110 X-RAY EXAM OF WRIST: CPT

## 2020-10-04 PROCEDURE — 29125 APPL SHORT ARM SPLINT STATIC: CPT

## 2020-10-04 RX ORDER — HYDROCODONE BITARTRATE AND ACETAMINOPHEN 5; 325 MG/1; MG/1
1 TABLET ORAL EVERY 6 HOURS PRN
Qty: 10 TABLET | Refills: 0 | Status: SHIPPED | OUTPATIENT
Start: 2020-10-04 | End: 2020-10-07

## 2020-10-04 RX ORDER — IBUPROFEN 800 MG/1
800 TABLET ORAL EVERY 8 HOURS PRN
Qty: 30 TABLET | Refills: 0 | Status: SHIPPED | OUTPATIENT
Start: 2020-10-04 | End: 2021-07-11

## 2020-10-04 ASSESSMENT — PAIN DESCRIPTION - PAIN TYPE
TYPE_2: ACUTE PAIN
TYPE: ACUTE PAIN
TYPE: ACUTE PAIN

## 2020-10-04 ASSESSMENT — PAIN DESCRIPTION - LOCATION
LOCATION: WRIST
LOCATION_2: HAND
LOCATION: WRIST

## 2020-10-04 ASSESSMENT — PAIN - FUNCTIONAL ASSESSMENT
PAIN_FUNCTIONAL_ASSESSMENT: 0-10
PAIN_FUNCTIONAL_ASSESSMENT: ACTIVITIES ARE NOT PREVENTED

## 2020-10-04 ASSESSMENT — PAIN DESCRIPTION - FREQUENCY: FREQUENCY: CONTINUOUS

## 2020-10-04 ASSESSMENT — PAIN DESCRIPTION - DESCRIPTORS
DESCRIPTORS_2: DISCOMFORT
DESCRIPTORS: ACHING
DESCRIPTORS: DISCOMFORT

## 2020-10-04 ASSESSMENT — PAIN SCALES - GENERAL
PAINLEVEL_OUTOF10: 3
PAINLEVEL_OUTOF10: 7

## 2020-10-04 ASSESSMENT — ENCOUNTER SYMPTOMS
NAUSEA: 0
SHORTNESS OF BREATH: 0
COLOR CHANGE: 0
VOMITING: 0
FACIAL SWELLING: 0
BACK PAIN: 0

## 2020-10-04 ASSESSMENT — PAIN DESCRIPTION - ORIENTATION
ORIENTATION: RIGHT
ORIENTATION_2: LEFT
ORIENTATION: RIGHT

## 2020-10-04 ASSESSMENT — PAIN DESCRIPTION - PROGRESSION: CLINICAL_PROGRESSION: GRADUALLY IMPROVING

## 2020-10-04 ASSESSMENT — PAIN DESCRIPTION - INTENSITY: RATING_2: 5

## 2020-10-04 ASSESSMENT — PAIN DESCRIPTION - ONSET: ONSET: ON-GOING

## 2020-10-05 NOTE — ED NOTES

## 2020-10-05 NOTE — ED PROVIDER NOTES
**ADVANCED PRACTICE PROVIDER, I HAVE EVALUATED THIS PATIENT**        [address]      Pt Name: Carmen Wren  DXV:[id number]  Aleksandar 1980  Date of evaluation: 10/4/2020  Provider: EVE Neves - CNP      Chief Complaint:    Chief Complaint   Patient presents with    Fall     fell down steps last night, injurin right wrist, left hand, pain. Nursing Notes, Past Medical Hx, Past Surgical Hx, Social Hx, Allergies, and Family Hx were all reviewed and agreed with or any disagreements were addressed in the HPI.    HPI:  (Location, Duration, Timing, Severity, Quality, Assoc Sx, Context, Modifying factors)  This is a  47 y.o. female who presents to the emergency department today complaining of right wrist and left hand pain. Onset was last night. The context was she tripped over a close basket and fell down onto an outstretched arm on stairs. She did not hit her head or lose consciousness. No neck or back pain. PastMedical/Surgical History:      Diagnosis Date    Anxiety     Diabetes mellitus ([facility] [address]     GDM-glyburide    Mixed hyperlipidemia 1/17/2020    Mixed hyperlipidemia     Pyelonephritis 4/7/2016    Severe obesity (BMI 35.0-39. 9) with comorbidity ([facility] [address] 1/17/2020         Procedure Laterality Date    WISDOM TOOTH EXTRACTION         Medications:  Discharge Medication List as of 10/4/2020  9:38 PM      CONTINUE these medications which have NOT CHANGED    Details   famotidine (PEPCID) 20 MG tablet Take 1 tablet by mouth 2 times daily for 7 days, Disp-14 tablet,R-0Print      acetaminophen (TYLENOL) 325 MG tablet Take 2 tablets by mouth every 4 hours as needed for Pain, Disp-120 tablet, R-3OTC      benzocaine-menthol (DERMOPLAST) 20-0.5 % AERO spray Apply topically as needed for Pain, Topical, PRN Starting Mon 10/8/2018, R-0, OTC      lanolin ointment Disp-1 Tube, R-3, OTCApply topically as needed.       Multiple Vitamin down steps last night, injurin right wrist, left hand, pain. FINDINGS: Skeletal structures are intact. No fracture or dislocation. No significant soft tissue abnormalities. No acute abnormality in the right wrist or in the left hand. Xr Hand Left (min 3 Views)    Result Date: 10/4/2020  EXAMINATION: 3 XRAY VIEWS OF THE RIGHT WRIST; THREE XRAY VIEWS OF THE LEFT HAND 10/4/2020 8:33 pm COMPARISON: None. HISTORY: ORDERING SYSTEM PROVIDED HISTORY: fell down steps last night, injurin right wrist, left hand, pain. TECHNOLOGIST PROVIDED HISTORY: Reason for exam:->fell down steps last night, injurin right wrist, left hand, pain. Reason for Exam: fell down steps last night, injurin right wrist, left hand, pain. FINDINGS: Skeletal structures are intact. No fracture or dislocation. No significant soft tissue abnormalities. No acute abnormality in the right wrist or in the left hand. MEDICAL DECISION MAKING / ED COURSE:      PROCEDURES:   Procedures    None    Patient was given:  Medications - No data to display    Differential diagnosis: includes but not limited to Arterial Injury/Ischemia, Fracture, Dislocation, Infection, Compartment Syndrome, Neurologic Deficit/Injury. Patient presents with left hand and right wrist injury secondary to fall onto outstretched hand. See HPI for full presentation. Physical exam as above. Pleasant well-appearing 82-year-old female lying in bed in no acute distress. Tenderness to the right wrist and left hand. She has some bruising and swelling. X-ray show no fracture. She has no neurovascular deficits and no snuffbox tenderness. Thumb spica splint was placed on the right. She was given a referral to orthopedic surgery and oral analgesics. At this time, the evidence for any other entities in the differential is insufficient to justify any further testing. This was explained to the patient.   The patient was advised that persistent or worsening symptoms will require further evaluation. The patient tolerated their visit well. I evaluated the patient. The physician was available for consultation as needed. The patient and / or the family were informed of the results of any tests, a time was given to answer questions, a plan was proposed and they agreed with plan. CLINICAL IMPRESSION:  1. Fall on stairs, initial encounter    2. Wrist injury, right, initial encounter    3. Injury of left hand, initial encounter        DISPOSITION Decision To Discharge 10/04/2020 09:24:01 PM      PATIENT REFERRED TO:  Ashwin Ornelas [facility] [address] [place] [address], [address]  Matilde Gupta  [phone]    Schedule an appointment as soon as possible for a visit       EVE Khan CNP  [address] [facility]  [phone]    Schedule an appointment as soon as possible for a visit       [facility] Emergency Department  [address] [facility] [address] [id number]  [phone]  Go to   As needed      DISCHARGE MEDICATIONS:  Discharge Medication List as of 10/4/2020  9:38 PM      START taking these medications    Details   HYDROcodone-acetaminophen (NORCO) 5-325 MG per tablet Take 1 tablet by mouth every 6 hours as needed for Pain for up to 3 days. , Disp-10 tablet,R-0Print             DISCONTINUED MEDICATIONS:  Discharge Medication List as of 10/4/2020  9:38 PM                 (Please note the MDM and HPI sections of this note were completed with a voice recognition program.  Efforts were made to edit the dictations but occasionally words are mis-transcribed.)    Electronically signed, EVE Neves CNP,           EVE Neves CNP  10/04/20 0824

## 2021-03-08 ENCOUNTER — HOSPITAL ENCOUNTER (OUTPATIENT)
Age: 41
Discharge: HOME | End: 2021-03-08
Payer: COMMERCIAL

## 2021-03-08 ENCOUNTER — HOSPITAL ENCOUNTER (OUTPATIENT)
Dept: GENERAL RADIOLOGY | Age: 41
Discharge: HOME | End: 2021-03-08
Payer: COMMERCIAL

## 2021-03-08 DIAGNOSIS — S23.41XA SPRAIN OF COSTAL CARTILAGE, INITIAL ENCOUNTER: ICD-10-CM

## 2021-03-08 PROCEDURE — 71111 X-RAY EXAM RIBS/CHEST4/> VWS: CPT

## 2021-07-11 ENCOUNTER — HOSPITAL ENCOUNTER (EMERGENCY)
Age: 41
Discharge: HOME | End: 2021-07-11
Attending: EMERGENCY MEDICINE
Payer: COMMERCIAL

## 2021-07-11 ENCOUNTER — APPOINTMENT (OUTPATIENT)
Dept: GENERAL RADIOLOGY | Age: 41
End: 2021-07-11
Payer: COMMERCIAL

## 2021-07-11 VITALS
DIASTOLIC BLOOD PRESSURE: 92 MMHG | HEART RATE: 92 BPM | RESPIRATION RATE: 16 BRPM | TEMPERATURE: 98.1 F | HEIGHT: 61 IN | SYSTOLIC BLOOD PRESSURE: 147 MMHG | BODY MASS INDEX: 31.72 KG/M2 | WEIGHT: 167.99 LBS | OXYGEN SATURATION: 96 %

## 2021-07-11 DIAGNOSIS — S62.616A CLOSED DISPLACED FRACTURE OF PROXIMAL PHALANX OF RIGHT LITTLE FINGER, INITIAL ENCOUNTER: Primary | ICD-10-CM

## 2021-07-11 PROCEDURE — 6370000000 HC RX 637 (ALT 250 FOR IP): Performed by: EMERGENCY MEDICINE

## 2021-07-11 PROCEDURE — 99284 EMERGENCY DEPT VISIT MOD MDM: CPT

## 2021-07-11 PROCEDURE — 73130 X-RAY EXAM OF HAND: CPT

## 2021-07-11 RX ORDER — IBUPROFEN 200 MG
400 TABLET ORAL EVERY 6 HOURS PRN
COMMUNITY

## 2021-07-11 RX ORDER — IBUPROFEN 400 MG/1
400 TABLET ORAL ONCE
Status: DISCONTINUED | OUTPATIENT
Start: 2021-07-11 | End: 2021-07-11

## 2021-07-11 RX ORDER — ACETAMINOPHEN 500 MG
1000 TABLET ORAL ONCE
Status: COMPLETED | OUTPATIENT
Start: 2021-07-11 | End: 2021-07-11

## 2021-07-11 RX ORDER — HYDROCODONE BITARTRATE AND ACETAMINOPHEN 5; 325 MG/1; MG/1
1 TABLET ORAL EVERY 4 HOURS PRN
Qty: 18 TABLET | Refills: 0 | Status: SHIPPED | OUTPATIENT
Start: 2021-07-11 | End: 2021-07-14

## 2021-07-11 RX ADMIN — ACETAMINOPHEN 1000 MG: 500 TABLET ORAL at 20:32

## 2021-07-11 ASSESSMENT — PAIN - FUNCTIONAL ASSESSMENT: PAIN_FUNCTIONAL_ASSESSMENT: 0-10

## 2021-07-11 ASSESSMENT — PAIN DESCRIPTION - LOCATION: LOCATION: FINGER (COMMENT WHICH ONE)

## 2021-07-11 ASSESSMENT — PAIN DESCRIPTION - DESCRIPTORS: DESCRIPTORS: THROBBING;ACHING;SHARP

## 2021-07-11 ASSESSMENT — PAIN SCALES - GENERAL
PAINLEVEL_OUTOF10: 4
PAINLEVEL_OUTOF10: 3

## 2021-07-11 ASSESSMENT — PAIN DESCRIPTION - FREQUENCY: FREQUENCY: CONTINUOUS

## 2021-07-11 ASSESSMENT — PAIN DESCRIPTION - PAIN TYPE: TYPE: ACUTE PAIN

## 2021-07-11 ASSESSMENT — PAIN DESCRIPTION - ORIENTATION: ORIENTATION: RIGHT

## 2021-07-16 ENCOUNTER — OFFICE VISIT (OUTPATIENT)
Dept: ORTHOPEDIC SURGERY | Age: 41
End: 2021-07-16

## 2021-07-16 VITALS — RESPIRATION RATE: 15 BRPM | BODY MASS INDEX: 31.53 KG/M2 | HEIGHT: 61 IN | WEIGHT: 167 LBS

## 2021-07-16 DIAGNOSIS — S62.616A CLOSED DISPLACED FRACTURE OF PROXIMAL PHALANX OF RIGHT LITTLE FINGER, INITIAL ENCOUNTER: Primary | ICD-10-CM

## 2021-07-16 PROCEDURE — 26720 TREAT FINGER FRACTURE EACH: CPT | Performed by: ORTHOPAEDIC SURGERY

## 2021-07-16 PROCEDURE — 99203 OFFICE O/P NEW LOW 30 MIN: CPT | Performed by: ORTHOPAEDIC SURGERY

## 2021-07-16 NOTE — PROGRESS NOTES
in the Last Year:    [address] in the Last Year:    Transportation Needs:     Lack of Transportation (Medical):  Lack of Transportation (Non-Medical):    Physical Activity:     Days of Exercise per Week:     Minutes of Exercise per Session:    Stress:     Feeling of Stress :    Social Connections:     Frequency of Communication with Friends and Family:     Frequency of Social Gatherings with Friends and Family:     Attends [employer] Services:     Active Member of Clubs or Organizations:     Attends Club or Organization Meetings:     Marital Status:    Intimate Partner Violence:     Fear of Current or Ex-Partner:     Emotionally Abused:     Physically Abused:     Sexually Abused:        Family History   Problem Relation Age of Onset    Cancer Paternal Grandfather         lung    Diabetes Paternal Grandfather     Diabetes Maternal Cousin     High Blood Pressure Mother     High Cholesterol Mother     Osteoporosis Mother     High Blood Pressure Father     Diabetes Father     Alcohol Abuse Brother     Osteoporosis Maternal Grandmother     Early Death Maternal Grandfather     Cancer Maternal Grandfather        Current Outpatient Medications on File Prior to Visit   Medication Sig Dispense Refill    ibuprofen (ADVIL;MOTRIN) 200 MG tablet Take 400 mg by mouth every 6 hours as needed for Pain       No current facility-administered medications on file prior to visit. Pertinent items are noted in HPI  Review of systems reviewed from Patient History Form dated on 7/16/2021 and available in the patient's chart under the Media tab. No change noted. PHYSICAL EXAMINATION:  Ms. Junie Oakes is a very pleasant 46 y.o.  female who presents today in no acute distress, awake, alert, and oriented. She is well dressed, nourished and  groomed. Patient with normal affect. Height is  5' 1\" (1.549 m), weight is 167 lb (75.8 kg), Body mass index is 31.55 kg/m².   Resting respiratory rate is 16.     Examination of the gait, showed that the patient walks with No limp . Examination of both Upper extremities showing a decreased range of motion of the right short (pinkie) finger compare to the other side. There is moderate swelling that can be seen, as well as ecchymosis of the short (pinkie) finger. She has intact sensation and good radial pulses. She has significant tenderness on deep palpation over the proximal phalanx of the short (pinkie) finger right hand. There is a rotational deformity of the right short (pinkie) finger. IMAGING: Ana Luisa Drop were reviewed, dated 7/11/2021,  3 views of the right hand, and showed a short (pinkie) finger proximal phalanx moderately displaced fracture. IMPRESSION: Right hand short (pinkie) finger proximal phalanx moderately displaced fracture. PLAN:  I discussed with Charmayne Farrier the overall alignment of the fracture and treatment options including both surgical and non-surgical treatment, and that my recommendation is an open reduction and internal fixation given the amount of displacement and instability of the fracture. I discussed the risks and benefits of surgery with the patient, including but not limited to infection, bleeding, pain, injury to nerves or blood vessels failure of the surgery and need for additional surgery. All the patient's questions were answered. We discussed an expected post-operative course. She  is understanding of this and wishes to wait and not doing surgery. We can try to treat this non-operatively in a finger splint. We discussed the risk of nonunion and or malunion. We will see her  back in 6 weeks at which time we will get a new xray of the right hand. PROCEDURE:    With the patient's permission, the right fourth and fifth fingers were strapped and tapped. The patient tolerated the procedure well.        Britany Valentin MD

## 2021-07-16 NOTE — LETTER
[address] [place] [address] Ortho & Spine  Surgery Scheduling Form:    DEMOGRAPHICS:                                                                                                              .  Patient Name:  Lindsay Trujillo  Patient :  1980   Patient SS#:      Patient Phone:  [phone] (home)  Alt. Patient Phone:    Patient Address:  [address] [address]  PCP:  EVE Padron CNP  Insurance:   Payor/Plan Subscr  Sex Relation Sub. Ins. ID Effective Group Num   1. [address] -[address] [facility] 1980 Female Self [id number]                                    [id number]     Insurance ID Number:    DIAGNOSIS & PROCEDURE:                                                                                            .    Diagnosis:   Right little finger proximal phalanx fracture   Operation:  Open reduction internal fixation right little finger   Location:  [place]  Surgeon:  Carissa Atwood MD    SCHEDULING INFORMATION:                                                                                         .    Surgeon's Scheduling Instruction:  elective  Requested Date:    OR Time:   Patient Arrival Time:    OR Time Required:  30  Minutes  Anesthesia:  General    SA Required:  Yes  Equipment:  lex  Mini C-Arm:  Yes    Standard C-Arm:  No  Status:  Outpatient    PAT Required:  Yes  Latex Allergy:  unknown    Defibrilator or Pacemaker:  unknown  Isolation Precautions:  unknown  Comments:                       Wilma Keita MD 21   BILLING INFORMATION:                                                                                                    .    Procedure:       CPT Code Modifier                  Pre-Certification:

## 2021-07-17 PROBLEM — S62.616A CLOSED DISPLACED FRACTURE OF PROXIMAL PHALANX OF RIGHT LITTLE FINGER: Status: ACTIVE | Noted: 2021-07-17

## 2021-09-15 NOTE — PROGRESS NOTES
Normal range of motion. Patient exhibits no edema or tenderness. Lymphadenopathy:        Head (right side): No submental, no submandibular, no preauricular, no posterior auricular and no occipital adenopathy present. Head (left side): No submental, no submandibular, no preauricular, no posterior auricular and no occipital adenopathy present. Patient  has no cervical adenopathy. Right: No supraclavicular adenopathy present. Left: No supraclavicular adenopathy present. Neurological: Patient is alert and oriented to person, place, and time. Patient has normal strength. Coordination and gait normal. GCS eye subscore is 4. GCS verbal subscore is 5. GCS motor subscore is 6. Skin: Skin is warm and dry. No abrasion and no rash noted. Patient  is not diaphoretic. No cyanosis or erythema. Psychiatric: Patient has a normal mood and affect. speech is normal and behavior is normal. Cognition and memory are normal.           Kareem García was seen today for obesity. Diagnoses and all orders for this visit:    Obesity (BMI 30.0-34.9)  -     CBC Auto Differential; Future  -     Comprehensive Metabolic Panel; Future  -     Hemoglobin A1C; Future  -     Iron and TIBC; Future  -     Lipid Panel; Future  -     TSH with Reflex; Future  -     Vitamin A; Future  -     Vitamin B1, Whole Blood; Future  -     Vitamin B12 & Folate; Future  -     Vitamin D 25 Hydroxy; Future  -     Vitamin E; Future  -     Vitamin K1; Future  -     EKG 12 Lead; Future    Mixed hyperlipidemia  -     CBC Auto Differential; Future  -     Comprehensive Metabolic Panel; Future  -     Hemoglobin A1C; Future  -     Iron and TIBC; Future  -     Lipid Panel; Future  -     TSH with Reflex; Future  -     Vitamin A; Future  -     Vitamin B1, Whole Blood; Future  -     Vitamin B12 & Folate; Future  -     Vitamin D 25 Hydroxy;  Future  -     Vitamin E; Future  -     Vitamin K1; Future  -     EKG 12 Lead; Future            Patient Active Problem List   Diagnosis    Tobacco use disorder    Rosacea    Bronchospasm, acute    Pyelonephritis    Gestational diabetes mellitus treated with oral hypoglycemic therapy    High-risk pregnancy in third trimester    Obesity (BMI 30.0-34. 9)    Mixed hyperlipidemia           A/P  Trevor Zee is a very pleasant 42 y.o. female with Obesity,  Body mass index is 31.97 kg/m². and multiple obesity related co-morbidities. Trevor Zee is very motivated to lose weight and being more healthy. The patient underwent 30 minutes of dietary counseling. I have reviewed, discussed and agree with the dietary plan. I believe patient is an excellent candidate for weight loss, and doing so will help the patient with avoiding / improving obesity related comorbid conditions. Patient was advised on healthy diet habits and regular exercise. Medical weight loss and different surgical options were discussed in details with patient. Patient is interested in medical weight loss. Medical weight loss options include but not limited to ; Anti-Obesity Medications (AOM) for weight loss. Based on patient's medical history and current medication list we will decide if any of the of those AOM are options for the patient Qsymia, Belviq and/or Contrave. This is only if their labs and EKG return within normal results. If the patient's lab results show certain abnormal values (kidney function, liver function, TSH etc.) this may contraindicate us from prescribing AOM. If the patient has an abnormal EKG, they may require additional cardiac clearance to determine if they are a candidate for AOM. Also informed the patient with the fact that any female patient in child bearing age has to avoid pregnancy and lactation while on this medication and need to ensure proper contraception.   If, following the complete review of the patient's medical history and current medications, the patient is not considered an appropriate candidate for AOM then they may be a candidate for our [facility] program based on their lab and EKG results. Otherwise, their only treatment option is diet and exercise. The patient understands it is their responsibility to have the labs and EKG completed prior to their next appointment. I have explained the above treatment plan to the patient, who verbalized agreement with plan. Patient received dietary handouts and education. Also discussed in details the importance of follow up, as well following the recommendations and completing the whole program to improve outcomes when it comes to healthier lifestyle as well weight loss. Patient also advised about risks and benefits being on a strict dietary regimen as well using supplements. Also discussed in details the different medication options for weight loss possible side effects and interaction with other medications. Patient questions answered. Patient agrees and wants to proceed with weight loss planning. Rikki Link will benefit from seeing our Behaviorist to work on behavioral aspects / changes to help with weight loss. 1- Nutritional Labs. 2- EKG. 3- Medical Weight Loss.    4- 1200 Meal Plan provided  5- follow-up with the behaviorist Breath sounds clear and equal bilaterally.

## 2021-12-04 ENCOUNTER — HOSPITAL ENCOUNTER (EMERGENCY)
Age: 41
Discharge: HOME | End: 2021-12-04
Attending: EMERGENCY MEDICINE
Payer: COMMERCIAL

## 2021-12-04 ENCOUNTER — APPOINTMENT (OUTPATIENT)
Dept: CT IMAGING | Age: 41
End: 2021-12-04
Payer: COMMERCIAL

## 2021-12-04 VITALS
SYSTOLIC BLOOD PRESSURE: 116 MMHG | RESPIRATION RATE: 16 BRPM | DIASTOLIC BLOOD PRESSURE: 93 MMHG | TEMPERATURE: 98.5 F | HEART RATE: 94 BPM | WEIGHT: 170.64 LBS | OXYGEN SATURATION: 98 % | HEIGHT: 61 IN | BODY MASS INDEX: 32.22 KG/M2

## 2021-12-04 DIAGNOSIS — K57.32 DIVERTICULITIS OF COLON: Primary | ICD-10-CM

## 2021-12-04 LAB
A/G RATIO: 1.6 (ref 1.1–2.2)
ALBUMIN SERPL-MCNC: 4.1 G/DL (ref 3.4–5)
ALP BLD-CCNC: 75 U/L (ref 40–129)
ALT SERPL-CCNC: 33 U/L (ref 10–40)
ANION GAP SERPL CALCULATED.3IONS-SCNC: 12 MMOL/L (ref 3–16)
AST SERPL-CCNC: 16 U/L (ref 15–37)
BACTERIA: ABNORMAL /HPF
BASOPHILS ABSOLUTE: 0.1 K/UL (ref 0–0.2)
BASOPHILS RELATIVE PERCENT: 1.2 %
BILIRUB SERPL-MCNC: 0.7 MG/DL (ref 0–1)
BILIRUBIN URINE: NEGATIVE
BLOOD, URINE: ABNORMAL
BUN BLDV-MCNC: 11 MG/DL (ref 7–20)
CALCIUM SERPL-MCNC: 9.3 MG/DL (ref 8.3–10.6)
CHLORIDE BLD-SCNC: 104 MMOL/L (ref 99–110)
CLARITY: CLEAR
CO2: 23 MMOL/L (ref 21–32)
COLOR: YELLOW
CREAT SERPL-MCNC: 0.6 MG/DL (ref 0.6–1.1)
EOSINOPHILS ABSOLUTE: 0.1 K/UL (ref 0–0.6)
EOSINOPHILS RELATIVE PERCENT: 0.4 %
EPITHELIAL CELLS, UA: ABNORMAL /HPF (ref 0–5)
GFR AFRICAN AMERICAN: >60
GFR NON-AFRICAN AMERICAN: >60
GLUCOSE BLD-MCNC: 94 MG/DL (ref 70–99)
GLUCOSE URINE: NEGATIVE MG/DL
HCG(URINE) PREGNANCY TEST: NEGATIVE
HCT VFR BLD CALC: 39.7 % (ref 36–48)
HEMOGLOBIN: 13.1 G/DL (ref 12–16)
KETONES, URINE: NEGATIVE MG/DL
LEUKOCYTE ESTERASE, URINE: NEGATIVE
LIPASE: 25 U/L (ref 13–60)
LYMPHOCYTES ABSOLUTE: 1.8 K/UL (ref 1–5.1)
LYMPHOCYTES RELATIVE PERCENT: 15 %
MCH RBC QN AUTO: 29.7 PG (ref 26–34)
MCHC RBC AUTO-ENTMCNC: 33.1 G/DL (ref 31–36)
MCV RBC AUTO: 89.7 FL (ref 80–100)
MICROSCOPIC EXAMINATION: YES
MONOCYTES ABSOLUTE: 1 K/UL (ref 0–1.3)
MONOCYTES RELATIVE PERCENT: 8.4 %
NEUTROPHILS ABSOLUTE: 8.8 K/UL (ref 1.7–7.7)
NEUTROPHILS RELATIVE PERCENT: 75 %
NITRITE, URINE: NEGATIVE
PDW BLD-RTO: 12.5 % (ref 12.4–15.4)
PH UA: 6 (ref 5–8)
PLATELET # BLD: 262 K/UL (ref 135–450)
PMV BLD AUTO: 7.9 FL (ref 5–10.5)
POTASSIUM REFLEX MAGNESIUM: 3.9 MMOL/L (ref 3.5–5.1)
PROTEIN UA: NEGATIVE MG/DL
RBC # BLD: 4.42 M/UL (ref 4–5.2)
RBC UA: ABNORMAL /HPF (ref 0–4)
SODIUM BLD-SCNC: 139 MMOL/L (ref 136–145)
SPECIFIC GRAVITY UA: 1.02 (ref 1–1.03)
TOTAL PROTEIN: 6.7 G/DL (ref 6.4–8.2)
URINE REFLEX TO CULTURE: ABNORMAL
URINE TYPE: ABNORMAL
UROBILINOGEN, URINE: 0.2 E.U./DL
WBC # BLD: 11.7 K/UL (ref 4–11)
WBC UA: ABNORMAL /HPF (ref 0–5)

## 2021-12-04 PROCEDURE — 84703 CHORIONIC GONADOTROPIN ASSAY: CPT

## 2021-12-04 PROCEDURE — 36415 COLL VENOUS BLD VENIPUNCTURE: CPT

## 2021-12-04 PROCEDURE — 74176 CT ABD & PELVIS W/O CONTRAST: CPT

## 2021-12-04 PROCEDURE — 99284 EMERGENCY DEPT VISIT MOD MDM: CPT

## 2021-12-04 PROCEDURE — 81001 URINALYSIS AUTO W/SCOPE: CPT

## 2021-12-04 PROCEDURE — 80053 COMPREHEN METABOLIC PANEL: CPT

## 2021-12-04 PROCEDURE — 83690 ASSAY OF LIPASE: CPT

## 2021-12-04 PROCEDURE — 85025 COMPLETE CBC W/AUTO DIFF WBC: CPT

## 2021-12-04 RX ORDER — AMOXICILLIN AND CLAVULANATE POTASSIUM 875; 125 MG/1; MG/1
1 TABLET, FILM COATED ORAL 2 TIMES DAILY
Qty: 20 TABLET | Refills: 0 | Status: SHIPPED | OUTPATIENT
Start: 2021-12-04 | End: 2021-12-14

## 2021-12-04 ASSESSMENT — PAIN DESCRIPTION - PAIN TYPE
TYPE: ACUTE PAIN

## 2021-12-04 ASSESSMENT — PAIN - FUNCTIONAL ASSESSMENT: PAIN_FUNCTIONAL_ASSESSMENT: 0-10

## 2021-12-04 ASSESSMENT — PAIN DESCRIPTION - LOCATION
LOCATION: FLANK

## 2021-12-04 ASSESSMENT — PAIN SCALES - GENERAL
PAINLEVEL_OUTOF10: 4
PAINLEVEL_OUTOF10: 2
PAINLEVEL_OUTOF10: 2

## 2021-12-04 ASSESSMENT — PAIN DESCRIPTION - ORIENTATION
ORIENTATION: LEFT

## 2021-12-04 ASSESSMENT — PAIN DESCRIPTION - FREQUENCY: FREQUENCY: INTERMITTENT

## 2021-12-04 ASSESSMENT — PAIN DESCRIPTION - DESCRIPTORS: DESCRIPTORS: SHOOTING;SHARP

## 2021-12-04 NOTE — ED PROVIDER NOTES
[address] [place] [address] ENCOUNTER      Pt Name: Maci Morales  MRN: [id number]  [place] 1980  Date of evaluation: 12/4/2021  Provider: Josefa Hoang [address] [place] [address]       Chief Complaint   Patient presents with    Flank Pain     L flank, radiates to lower abd x3days worsening. pain before urination         HISTORY OF PRESENT ILLNESS   (Location/Symptom, Timing/Onset, Context/Setting, Quality, Duration, Modifying Factors, Severity)  Note limiting factors. Maci Morales is a 46 y.o. female who presents to the emergency department with a complaint of left flank pain that initially began 3 days ago when it waking her from sleep during the night. Pain is worsened before and after urination. However, she denies any dysuria, frequency or urgency. She denies any hematuria. Urine has appeared dark at times. She denies any personal or family history of kidney stones. She denies any trauma or injury. No vaginal bleeding or discharge. Appetite is normal.  She denies any abdominal pain nausea vomiting or diarrhea. She denies any chest pain or shortness of breath. No dyspnea on exertion. She does not take any anticoagulants. Nursing Notes were reviewed. HPI        REVIEW OF SYSTEMS    (2-9 systems for level 4, 10 or more for level 5)       Constitutional: Negative for fever or chills. HENT: Negative for rhinorrhea and sore throat. Eyes: Negative for redness or drainage. Respiratory: Negative for shortness of breath or dyspnea on exertion. Cardiovascular: Negative for chest pain. Gastrointestinal: Negative for abdominal pain. Negative for vomiting or diarrhea. Neurological: Negative for headache. .        All systems are reviewed and are negative except for those listed above in the history of present illness and ROS.         PAST MEDICAL HISTORY     Past Medical History:   Diagnosis Date    Anxiety     Closed displaced fracture of proximal phalanx of right little finger 7/17/2021    Diabetes mellitus ([address]     GDM-glyburide    Mixed hyperlipidemia 1/17/2020    Mixed hyperlipidemia     Pyelonephritis 4/7/2016    Severe obesity (BMI 35.0-39. 9) with comorbidity ([facility] [address] 1/17/2020         SURGICAL HISTORY       Past Surgical History:   Procedure Laterality Date    WISDOM TOOTH EXTRACTION           CURRENT MEDICATIONS       Previous Medications    IBUPROFEN (ADVIL;MOTRIN) 200 MG TABLET    Take 400 mg by mouth every 6 hours as needed for Pain       ALLERGIES     No known allergies    FAMILY HISTORY       Family History   Problem Relation Age of Onset    Cancer Paternal Grandfather         lung    Diabetes Paternal Grandfather     Diabetes Maternal Cousin     High Blood Pressure Mother     High Cholesterol Mother     Osteoporosis Mother     High Blood Pressure Father     Diabetes Father     Alcohol Abuse Brother     Osteoporosis Maternal Grandmother     Early Death Maternal Grandfather     Cancer Maternal Grandfather           SOCIAL HISTORY       Social History     Socioeconomic History    Marital status:      Spouse name: None    Number of children: None    Years of education: None    Highest education level: None   Occupational History    None   Tobacco Use    Smoking status: Current Some Day Smoker     Packs/day: 0.25     Years: 20.00     Pack years: 5.00     Types: Cigarettes     Last attempt to quit: 2/7/2018     Years since quitting: 3.8    Smokeless tobacco: Never Used    Tobacco comment: 4 cigs a day max when she smokes/not smoking during pregnancy   Vaping Use    Vaping Use: Never used   Substance and Sexual Activity    Alcohol use:  Yes    Drug use: No    Sexual activity: Yes     Partners: Male   Other Topics Concern    None   Social History Narrative    None     Social Determinants of Health     Financial Resource Strain:     Difficulty of Paying Living Expenses: Not on file   Food Insecurity:     of trauma. Abdomen:  Soft, nondistended, bowel sounds present. Nontender. No guarding rigidity or rebound. No masses. Able to elicit any abdominal tenderness. However, there was tenderness in the lateral aspect of the left mid flank area. Questionable CVA tenderness on the left. Musculoskeletal: Extremities non-tender with full range of motion. Radial and dorsalis pedis pulses were intact. No calf tenderness erythema or edema. Neurological: Alert and oriented x 3. Speech clear. No facial droop. No acute focal motor or sensory deficits. Skin: Skin is warm and dry. No rash. Lymphatic:  No lympadenopathy. Psychiatric: Normal mood and affect. Behavior is normal.         DIAGNOSTIC RESULTS     EKG: All EKG's are interpreted by the Emergency Department Physician who either signs or Co-signs this chart in the absence of a cardiologist.        RADIOLOGY:   Non-plain film images such as CT, Ultrasound and MRI are read by the radiologist. Plain radiographic images are visualized and preliminarily interpreted by the emergency physician with the below findings:        Interpretation per the Radiologist below, if available at the time of this note:    CT ABDOMEN PELVIS WO CONTRAST Additional Contrast? None   Final Result   Acute, uncomplicated diverticulitis of the mid descending colon. A ventral   follow-up colonoscopy is recommended.                ED BEDSIDE ULTRASOUND:   Performed by ED Physician - none    LABS:  Labs Reviewed   CBC WITH AUTO DIFFERENTIAL - Abnormal; Notable for the following components:       Result Value    WBC 11.7 (*)     Neutrophils Absolute 8.8 (*)     All other components within normal limits    Narrative:     Performed at:  [facility]  [address] [place], [facility]   Phone [phone]   URINE RT REFLEX TO CULTURE - Abnormal; Notable for the following components:    Blood, Urine MODERATE (*)     All other components within normal limits    Narrative:     Performed at:  [facility]  [address] [place], [place]   Phone [phone]   MICROSCOPIC URINALYSIS - Abnormal; Notable for the following components:    Bacteria, UA 2+ (*)     All other components within normal limits    Narrative:     Performed at:  [facility]  [address] [place], [place]   Phone [phone]   COMPREHENSIVE METABOLIC PANEL W/ REFLEX TO MG FOR LOW K    Narrative:     Performed at:  [facility]  [address] [place], [place]   Phone [phone]   LIPASE    Narrative:     Performed at:  [address] [facility] [address] [facility]  [address] [place], [place]   Phone [phone]   PREGNANCY, URINE    Narrative:     Performed at:  [address] [facility] [facility]  [address] [place], [place]   Phone [phone]       All other labs were within normal range or not returned as of this dictation. EMERGENCY DEPARTMENT COURSE and DIFFERENTIAL DIAGNOSIS/MDM:   Vitals:    Vitals:    12/04/21 1320   BP: (!) 116/93   Pulse: 94   Resp: 16   Temp: 98.5 °F (36.9 °C)   TempSrc: Oral   SpO2: 98%   Weight: 170 lb 10.2 oz (77.4 kg)   Height: 5' 1\" (1.549 m)         MDM      Patient presents with left flank pain, worsened before and after urination for the last 3 days. There is questionable CVA tenderness on the left side and some tenderness in the left mid lateral flank. This reproduces her pain. She was sent for CT stone protocol to rule out ureteral stone. She took ibuprofen prior to arrival with significant improvement. She was offered pain medication but declined. REASSESSMENT          3:05 PM: Patient's pain is well controlled. She is hemodynamically stable. She is afebrile. White blood cell count is slightly elevated at 11.7. CT abdomen and pelvis without contrast was obtained which reveals acute uncomplicated diverticulitis of the descending colon. This is consistent with her location of pain and tenderness. She is stable for discharge. She will be treated with Augmentin 875 mg twice daily. She was given the first dose in the emergency department. I advised her to drink plenty of fluids. Advised to avoid particulate foods, seeds, nuts etc.    I recommended further evaluation with colonoscopy after resolution of her symptoms. She was advised to follow-up with her primary care physician in 1 to 2 days for reexamination and to schedule follow-up colonoscopy. If her condition worsens or new symptoms develop, she was advised to return immediately to the emergency department. CRITICAL CARE TIME   Total Critical Care time was 0 minutes, excluding separately reportable procedures. There was a high probability of clinically significant/life threatening deterioration in the patient's condition which required my urgent intervention. CONSULTS:  None    PROCEDURES:  Unless otherwise noted below, none     Procedures        FINAL IMPRESSION      1. Diverticulitis of colon          DISPOSITION/PLAN   DISPOSITION        PATIENT REFERRED TO:  EVE Regalado [address]  [phone]    Call today        DISCHARGE MEDICATIONS:  New Prescriptions    AMOXICILLIN-CLAVULANATE (AUGMENTIN) 875-125 MG PER TABLET    Take 1 tablet by mouth 2 times daily for 10 days     Controlled Substances Monitoring:     No flowsheet data found. (Please note that portions of this note were completed with a voice recognition program.  Efforts were made to edit the dictations but occasionally words are mis-transcribed. )    7460 Srinivasan Montelongo DO (electronically signed)  Attending Emergency Physician          Linsey Crane DO  12/04/21 2800

## 2021-12-04 NOTE — Clinical Note
Trey Blackwell was seen and treated in our emergency department on 12/4/2021. She may return to work on 12/07/2021. If you have any questions or concerns, please don't hesitate to call.       Payton Dumont, DO

## 2022-04-07 ENCOUNTER — HOSPITAL ENCOUNTER (OUTPATIENT)
Dept: ULTRASOUND IMAGING | Age: 42
Discharge: HOME | End: 2022-04-07
Payer: COMMERCIAL

## 2022-04-07 ENCOUNTER — HOSPITAL ENCOUNTER (OUTPATIENT)
Dept: WOMENS IMAGING | Age: 42
Discharge: HOME | End: 2022-04-07
Payer: COMMERCIAL

## 2022-04-07 DIAGNOSIS — R92.8 ABNORMAL MAMMOGRAM: ICD-10-CM

## 2022-04-07 PROCEDURE — 76642 ULTRASOUND BREAST LIMITED: CPT

## 2022-04-07 PROCEDURE — G0279 TOMOSYNTHESIS, MAMMO: HCPCS

## 2022-04-18 RX ORDER — CETIRIZINE HYDROCHLORIDE 10 MG/1
10 TABLET ORAL DAILY
COMMUNITY

## 2022-04-18 RX ORDER — MULTIVIT-MIN/IRON/FOLIC ACID/K 18-600-40
CAPSULE ORAL
COMMUNITY

## 2022-04-18 RX ORDER — FLUOXETINE HYDROCHLORIDE 20 MG/1
20 CAPSULE ORAL DAILY
COMMUNITY

## 2022-04-18 RX ORDER — M-VIT,TX,IRON,MINS/CALC/FOLIC 27MG-0.4MG
1 TABLET ORAL DAILY
COMMUNITY

## 2022-04-18 NOTE — PROGRESS NOTES
[facility] ENDOSCOPY COLONOSCOPY PRE-OPERATIVE INSTRUCTIONS    Procedure date____4/21/22_____Arrival time___630_________        Surgery time_____730_______       Clear liquids the day before the procedure. Do not eat or drink anything within 5 hours of your procedure. This includes water chewing gum, mints and ice chips. You may brush your teeth and gargle the morning of your surgery, but do not swallow the water    You may be asked to stop blood thinners such as Coumadin, Plavix, Fragmin, Lovenox, etc., or any anti-inflammatories such as:  Aspirin, Ibuprofen, Advil, Naproxen prior to your procedure. We also ask that you stop any OTC medications such as fish oil, vitamin E, glucosamine, garlic, Multivitamins, COQ 10, etc.    You must make arrangements for a responsible adult to arrive with you and stay in our waiting area during your procedure. They will also need to take you home after your procedure. For your safety you will not be allowed to leave alone or drive yourself home. Also for your safety, it is strongly suggested that someone stay with you the first 24 hours after your procedure. For your comfort, please wear simple loose fitting clothing to the center. Please do not bring valuables. If you have a living will and a durable power of  for healthcare, please bring in a copy.      You will need to bring a photo ID and insurance card    Our goal is to provide you with excellent care so if you have any questions, please contact us at the [facility] at [phone]         Please note these are generalized instructions for all colonoscopy cases, you may be provided with more specific instructions if necessary

## 2022-04-20 ENCOUNTER — ANESTHESIA EVENT (OUTPATIENT)
Dept: ENDOSCOPY | Age: 42
End: 2022-04-20
Payer: COMMERCIAL

## 2022-04-21 ENCOUNTER — ANESTHESIA (OUTPATIENT)
Dept: ENDOSCOPY | Age: 42
End: 2022-04-21
Payer: COMMERCIAL

## 2022-04-21 ENCOUNTER — HOSPITAL ENCOUNTER (OUTPATIENT)
Age: 42
Setting detail: OUTPATIENT SURGERY
Discharge: HOME | End: 2022-04-21
Attending: INTERNAL MEDICINE | Admitting: INTERNAL MEDICINE
Payer: COMMERCIAL

## 2022-04-21 VITALS
SYSTOLIC BLOOD PRESSURE: 141 MMHG | DIASTOLIC BLOOD PRESSURE: 86 MMHG | WEIGHT: 167.8 LBS | OXYGEN SATURATION: 100 % | TEMPERATURE: 97.2 F | HEIGHT: 61 IN | BODY MASS INDEX: 31.68 KG/M2 | HEART RATE: 66 BPM | RESPIRATION RATE: 16 BRPM

## 2022-04-21 VITALS
DIASTOLIC BLOOD PRESSURE: 88 MMHG | SYSTOLIC BLOOD PRESSURE: 125 MMHG | OXYGEN SATURATION: 99 % | RESPIRATION RATE: 14 BRPM

## 2022-04-21 DIAGNOSIS — Z12.11 SCREEN FOR COLON CANCER: ICD-10-CM

## 2022-04-21 LAB — PREGNANCY, URINE: NEGATIVE

## 2022-04-21 PROCEDURE — 2580000003 HC RX 258: Performed by: ANESTHESIOLOGY

## 2022-04-21 PROCEDURE — 7100000010 HC PHASE II RECOVERY - FIRST 15 MIN: Performed by: INTERNAL MEDICINE

## 2022-04-21 PROCEDURE — 3609010600 HC COLONOSCOPY POLYPECTOMY SNARE/COLD BIOPSY: Performed by: INTERNAL MEDICINE

## 2022-04-21 PROCEDURE — 84703 CHORIONIC GONADOTROPIN ASSAY: CPT

## 2022-04-21 PROCEDURE — 2709999900 HC NON-CHARGEABLE SUPPLY: Performed by: INTERNAL MEDICINE

## 2022-04-21 PROCEDURE — 88305 TISSUE EXAM BY PATHOLOGIST: CPT

## 2022-04-21 PROCEDURE — 3700000001 HC ADD 15 MINUTES (ANESTHESIA): Performed by: INTERNAL MEDICINE

## 2022-04-21 PROCEDURE — 3700000000 HC ANESTHESIA ATTENDED CARE: Performed by: INTERNAL MEDICINE

## 2022-04-21 PROCEDURE — 6360000002 HC RX W HCPCS: Performed by: NURSE ANESTHETIST, CERTIFIED REGISTERED

## 2022-04-21 PROCEDURE — 2500000003 HC RX 250 WO HCPCS: Performed by: NURSE ANESTHETIST, CERTIFIED REGISTERED

## 2022-04-21 PROCEDURE — 7100000011 HC PHASE II RECOVERY - ADDTL 15 MIN: Performed by: INTERNAL MEDICINE

## 2022-04-21 RX ORDER — PROPOFOL 10 MG/ML
INJECTION, EMULSION INTRAVENOUS PRN
Status: DISCONTINUED | OUTPATIENT
Start: 2022-04-21 | End: 2022-04-21 | Stop reason: SDUPTHER

## 2022-04-21 RX ORDER — SODIUM CHLORIDE 9 MG/ML
INJECTION, SOLUTION INTRAVENOUS PRN
Status: DISCONTINUED | OUTPATIENT
Start: 2022-04-21 | End: 2022-04-21 | Stop reason: HOSPADM

## 2022-04-21 RX ORDER — SODIUM CHLORIDE 0.9 % (FLUSH) 0.9 %
5-40 SYRINGE (ML) INJECTION EVERY 12 HOURS SCHEDULED
Status: DISCONTINUED | OUTPATIENT
Start: 2022-04-21 | End: 2022-04-21 | Stop reason: HOSPADM

## 2022-04-21 RX ORDER — SODIUM CHLORIDE 0.9 % (FLUSH) 0.9 %
5-40 SYRINGE (ML) INJECTION PRN
Status: DISCONTINUED | OUTPATIENT
Start: 2022-04-21 | End: 2022-04-21 | Stop reason: HOSPADM

## 2022-04-21 RX ORDER — LIDOCAINE HYDROCHLORIDE 20 MG/ML
INJECTION, SOLUTION EPIDURAL; INFILTRATION; INTRACAUDAL; PERINEURAL PRN
Status: DISCONTINUED | OUTPATIENT
Start: 2022-04-21 | End: 2022-04-21 | Stop reason: SDUPTHER

## 2022-04-21 RX ADMIN — PROPOFOL 50 MG: 10 INJECTION, EMULSION INTRAVENOUS at 07:36

## 2022-04-21 RX ADMIN — SODIUM CHLORIDE: 9 INJECTION, SOLUTION INTRAVENOUS at 07:30

## 2022-04-21 RX ADMIN — PROPOFOL 120 MG: 10 INJECTION, EMULSION INTRAVENOUS at 07:32

## 2022-04-21 RX ADMIN — LIDOCAINE HYDROCHLORIDE 80 MG: 20 INJECTION, SOLUTION EPIDURAL; INFILTRATION; INTRACAUDAL; PERINEURAL at 07:32

## 2022-04-21 RX ADMIN — PROPOFOL 50 MG: 10 INJECTION, EMULSION INTRAVENOUS at 07:41

## 2022-04-21 ASSESSMENT — PAIN - FUNCTIONAL ASSESSMENT: PAIN_FUNCTIONAL_ASSESSMENT: 0-10

## 2022-04-21 ASSESSMENT — LIFESTYLE VARIABLES: SMOKING_STATUS: 1

## 2022-04-21 NOTE — ANESTHESIA PRE PROCEDURE
Department of Anesthesiology  Preprocedure Note       Name:  Helene Curtis   Age:  38 y.o.  :  1980                                          MRN:  [id number]         Date:  2022      Surgeon: Jagdeep Alvarenga):  Radha Sierra MD    Procedure: Procedure(s):  COLORECTAL CANCER SCREENING, NOT HIGH RISK    Medications prior to admission:   Prior to Admission medications    Medication Sig Start Date End Date Taking? Authorizing Provider   FLUoxetine (PROZAC) 20 MG capsule Take 20 mg by mouth daily   Yes Historical Provider, MD   Multiple Vitamins-Minerals (THERAPEUTIC MULTIVITAMIN-MINERALS) tablet Take 1 tablet by mouth daily   Yes Historical Provider, MD   Cholecalciferol (VITAMIN D) 50 MCG ( UT) CAPS capsule Take by mouth Unsure of dosage   Yes Historical Provider, MD   cetirizine (ZYRTEC) 10 MG tablet Take 10 mg by mouth daily   Yes Historical Provider, MD   ibuprofen (ADVIL;MOTRIN) 200 MG tablet Take 400 mg by mouth every 6 hours as needed for Pain    Historical Provider, MD       Current medications:    Current Facility-Administered Medications   Medication Dose Route Frequency Provider Last Rate Last Admin    sodium chloride flush 0.9 % injection 5-40 mL  5-40 mL IntraVENous 2 times per day Mabel Rosario MD        sodium chloride flush 0.9 % injection 5-40 mL  5-40 mL IntraVENous PRN Mabel Rosario MD        0.9 % sodium chloride infusion   IntraVENous PRN Mabel Rosario MD           Allergies:     Allergies   Allergen Reactions    No Known Allergies      2017 - 17, 9/11/17 10/12/2016 - ls 2016 -  2016 - ls 2016 -  2016 -  2016 -  2016 - ls 2016 - ls 2016 - ls 2015 -  10/26/2015 -  09/15/2015 -         Problem List:    Patient Active Problem List   Diagnosis Code    Tobacco use disorder F17.200    Rosacea L71.9    Bronchospasm, acute J98.01    Pyelonephritis N12    Gestational diabetes mellitus treated with oral hypoglycemic therapy O24.415    High-risk pregnancy in third trimester O09.93    Obesity (BMI 30.0-34. 9) E66.9    Mixed hyperlipidemia E78.2    Closed displaced fracture of proximal phalanx of right little finger S62.616A       Past Medical History:        Diagnosis Date    Anxiety     Closed displaced fracture of proximal phalanx of right little finger 2021    Diabetes mellitus ([facility]     GDM-glyburide    Mixed hyperlipidemia 2020    Mixed hyperlipidemia     Pyelonephritis 2016    Severe obesity (BMI 35.0-39. 9) with comorbidity ([facility] 2020       Past Surgical History:        Procedure Laterality Date    WISDOM TOOTH EXTRACTION         Social History:    Social History     Tobacco Use    Smoking status: Current Some Day Smoker     Packs/day: 0.25     Years: 20.00     Pack years: 5.00     Types: Cigarettes     Last attempt to quit: 2018     Years since quittin.2    Smokeless tobacco: Never Used    Tobacco comment: pack a week   Substance Use Topics    Alcohol use: Yes     Comment: weekends                                Ready to quit: Not Answered  Counseling given: Not Answered  Comment: pack a week      Vital Signs (Current):   Vitals:    22 0923 22 0717   BP:  (!) 139/92   Pulse:  76   Resp:  18   Temp:  97.8 °F (36.6 °C)   TempSrc:  Temporal   SpO2:  99%   Weight: 174 lb (78.9 kg) 167 lb 12.8 oz (76.1 kg)   Height: 5' 1\" (1.549 m) 5' 1\" (1.549 m)                                              BP Readings from Last 3 Encounters:   22 (!) 139/92   21 (!) 116/93   21 (!) 147/92       NPO Status: Time of last liquid consumption: 0023                        Time of last solid consumption: 1900                        Date of last liquid consumption: 22                        Date of last solid food consumption: 22    BMI:   Wt Readings from Last 3 Encounters:   22 167 lb 12.8 oz (76.1 kg)   21 170 lb 10.2 oz (77.4 kg)   21 167 lb (75.8 kg)     Body mass index is 31.71 kg/m². CBC:   Lab Results   Component Value Date    WBC 11.7 12/04/2021    RBC 4.42 12/04/2021    HGB 13.1 12/04/2021    HCT 39.7 12/04/2021    MCV 89.7 12/04/2021    RDW 12.5 12/04/2021     12/04/2021       CMP:   Lab Results   Component Value Date     12/04/2021    K 3.9 12/04/2021     12/04/2021    CO2 23 12/04/2021    BUN 11 12/04/2021    CREATININE 0.6 12/04/2021    GFRAA >60 12/04/2021    GFRAA >60 12/01/2012    AGRATIO 1.6 12/04/2021    LABGLOM >60 12/04/2021    GLUCOSE 94 12/04/2021    PROT 6.7 12/04/2021    PROT 6.2 10/17/2012    CALCIUM 9.3 12/04/2021    BILITOT 0.7 12/04/2021    ALKPHOS 75 12/04/2021    AST 16 12/04/2021    ALT 33 12/04/2021       POC Tests: No results for input(s): POCGLU, POCNA, POCK, POCCL, POCBUN, POCHEMO, POCHCT in the last 72 hours. Coags:   Lab Results   Component Value Date    PROTIME 10.1 04/12/2016    INR 0.89 04/12/2016    APTT 29.7 04/12/2016       HCG (If Applicable):   Lab Results   Component Value Date    PREGTESTUR Negative 12/04/2021        ABGs: No results found for: PHART, PO2ART, MSP6JUJ, VOY7DYL, BEART, A9XUBMUC     Type & Screen (If Applicable):  No results found for: LABABO, LABRH    Drug/Infectious Status (If Applicable):  No results found for: HIV, HEPCAB    COVID-19 Screening (If Applicable): No results found for: COVID19        Anesthesia Evaluation   no history of anesthetic complications:   Airway: Mallampati: II  TM distance: >3 FB   Neck ROM: full  Mouth opening: > = 3 FB Dental: normal exam         Pulmonary:   (+) current smoker    (-) rhonchi, wheezes and rales  Asthma: RAD, history of bronchospasm. Cardiovascular:  Exercise tolerance: good (>4 METS),   (+) dysrhythmias (Short SD syndrome ):, hyperlipidemia    (-) murmur, weak pulses and peripheral edemaHypertension: no meds.       Rhythm: regular  Rate: normal                 ROS comment: EKG:  Sinus  Rhythm   Short VT syndrome, Eleanor = 116  BORDERLINE RHYTHM     Neuro/Psych:   (+) depression/anxiety    (-) seizures, TIA and CVA           GI/Hepatic/Renal:   (+) bowel prep,      (-) liver disease, no renal disease and no morbid obesity       Endo/Other:        (-) diabetes mellitus (gestational; HgbA1c <6%), blood dyscrasia               Abdominal:       Abdomen: soft. 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  Presenting for initial screening colonoscopy. NPO appropriate; denies active nausea / reflux. )        Anesthetic plan and risks discussed with patient. Plan discussed with CRNA. This pre-anesthesia assessment may be used as a history and physical.    DOS STAFF ADDENDUM:    Pt seen and examined, chart reviewed (including anesthesia, drug and allergy history). No interval changes to history and physical examination. Anesthetic plan, risks, benefits, alternatives, and personnel involved discussed with patient. Patient verbalized an understanding and agrees to proceed.       Maddison Garcia MD  April 21, 2022  7:20 AM

## 2022-04-21 NOTE — H&P
Pre-operative History and Physical    Patient: Chelsey Davis  : 1980  Acct#:     Intended Procedure:  Colonoscopy     HISTORY OF PRESENT ILLNESS:  The patient is a 38 y.o. female  who presents for/due to Diverticulitis/Left sided abdominal pain    Past Medical History:        Diagnosis Date    Anxiety     Closed displaced fracture of proximal phalanx of right little finger 2021    Diabetes mellitus ([facility] [address]     GDM-glyburide    Mixed hyperlipidemia 2020    Mixed hyperlipidemia     Pyelonephritis 2016    Severe obesity (BMI 35.0-39. 9) with comorbidity ([facility] [address] 2020     Past Surgical History:        Procedure Laterality Date    WISDOM TOOTH EXTRACTION       Medications Prior to Admission:   Prior to Admission medications    Medication Sig Start Date End Date Taking? Authorizing Provider   FLUoxetine (PROZAC) 20 MG capsule Take 20 mg by mouth daily   Yes Historical Provider, MD   Multiple Vitamins-Minerals (THERAPEUTIC MULTIVITAMIN-MINERALS) tablet Take 1 tablet by mouth daily   Yes Historical Provider, MD   Cholecalciferol (VITAMIN D) 50 MCG ( UT) CAPS capsule Take by mouth Unsure of dosage   Yes Historical Provider, MD   cetirizine (ZYRTEC) 10 MG tablet Take 10 mg by mouth daily   Yes Historical Provider, MD   ibuprofen (ADVIL;MOTRIN) 200 MG tablet Take 400 mg by mouth every 6 hours as needed for Pain    Historical Provider, MD       Allergies:  No known allergies    Social History:   TOBACCO:   reports that she has been smoking cigarettes. She has a 5.00 pack-year smoking history. She has never used smokeless tobacco.  ETOH:   reports current alcohol use. DRUGS:   reports no history of drug use. PHYSICAL EXAM:      Vital Signs: Ht 5' 1\" (1.549 m)   Wt 174 lb (78.9 kg)   LMP 2022   BMI 32.88 kg/m²    Airway: No stridor or wheezing noted. Good air movement  Pulmonary: without wheezes.   Clear to auscultation  Cardiac:regular rate and rhythm without loud murmurs  Abdomen:soft, nontender,  Bowel sounds present    Pre-Procedure Assessment / Plan:  1) Colonoscopy     ASA Grade:  ASA 2 - Patient with mild systemic disease with no functional limitations  Mallampati Classification:  Class II    Level of Sedation Plan:Deep sedation    Post Procedure plan: Return to same level of care    I assessed the patient and find that the patient is in satisfactory condition to proceed with the planned procedure and sedation plan. I have explained the risk, benefits, and alternatives to the procedure; the patient understands and agrees to proceed.        Jumana Ritter MD  4/21/2022

## 2022-04-21 NOTE — OP NOTE
Colonoscopy Procedure Note      Patient: Juvencio Sim  : 1980  Acct#:     Procedure: Colonoscopy with biopsy    Date:  2022    Surgeon:  Billy Carrasco MD    Referring Physician:  EVE Rodriguez - CNP    Previous Colonoscopy: NO  Date: N/A  Greater than 3 years: N/A    Preoperative Diagnosis:  1. Diverticulitis/Left sided abdominal pain    Postoperative Diagnosis: 1. Transverse/Left Colon Polyp 2. Mild Left Colon Diverticulosis     Consent:  The patient or their legal guardian has signed a consent, and is aware of the potential risks, benefits, alternatives, and potential complications of this procedure. These include, but are not limited to hemorrhage, bleeding, post procedural pain, perforation, phlebitis, aspiration, hypotension, hypoxia, cardiovascular events such as arryhthmia, and possibly death. Additionally, the possibility of missed colonic polyps and interval colon cancer was discussed in the consent. Anesthesia:  The patient was administered IV propofol per anesthesiology team.  Please see their operative records for full details. Procedure: An informed consent was obtained from the patient after explanation of indications, benefits, possible risks and complications of the procedure. The patient was then taken to the endoscopy suite, placed in the left lateral decubitus position, and the above IV anesthesia was administered. A digital rectal examination was performed and revealed negative without mass, lesions or tenderness. The Olympus video colonoscope was placed in the patient's rectum under digital direction and advanced to the cecum. The cecum was identified by characteristic anatomy and ballottment. The preparation was excellent. The ileocecal valve was identified. The terminal ileum was normal.     The scope was then withdrawn back through the cecum, ascending, transverse, descending, sigmoid colon, and rectum.   Careful circumferential examination of the mucosa in these areas demonstrated:    1. A 3 mm polyp in the transverse colon removed completely with biopsy polypectomy  2. A 4 mm polyp in the left colon removed completely with biopsy polypectomy  3. The left colon had evidence of mild diverticulosis     The scope was then withdrawn into the rectum and retroflexed. The retroflexed view of the anal verge and rectum demonstrates normal rectum. The scope was straightened, the colon was decompressed and the scope was withdrawn from the patient. The patient tolerated the procedure well and was taken to the PACU in good condition. Estimated Blood Loss (mL): < 5 CC     Complications: None    Specimens:   ID Type Source Tests Collected by Time Destination   A : Transvers colon polyp bx/ left colon polyp bx Tissue Tissue SURGICAL PATHOLOGY Radha Sierra MD 4/21/2022 6754      Impression:  See post-procedure diagnoses. Recommendations:    1. Await pathology results. 2. Recommend repeat colonoscopy in 5 years for surveillance purposes. 3. Recommend maintaining a high fiber diet. 4. The patient had biopsies taken today. The patient should call for results in 7 days if they have not heard from our office. Our number is [phone].     BISI Bettencourt [address] and [address]  4/21/2022  [phone]

## 2022-04-22 NOTE — ANESTHESIA POSTPROCEDURE EVALUATION
Department of Anesthesiology  Postprocedure Note    Patient: Roscoe Richmond  MRN: [id number]  YOB: 1980  Date of evaluation: 4/21/2022  Time:  8:58 PM     Procedure Summary     Date: 04/21/22 Room / Location: [address]    Anesthesia Start: 0730 Anesthesia Stop: 6753    Procedure: COLONOSCOPY POLYPECTOMY SNARE/COLD BIOPSY (N/A ) Diagnosis:       Screen for colon cancer      (Screen for colon cancer)    Surgeons: Ronald Ortiz MD Responsible Provider: Jazmyn Cortes MD    Anesthesia Type: MAC ASA Status: 2          Anesthesia Type: MAC    Marialuisa Phase I: Marialuisa Score: 10    Marialuisa Phase II: Marialuisa Score: 10    Last vitals: Reviewed and per EMR flowsheets. Anesthesia Post Evaluation    Patient location during evaluation: PACU  Patient participation: complete - patient participated  Level of consciousness: awake and alert  Airway patency: patent  Nausea & Vomiting: no nausea and no vomiting  Complications: no  Cardiovascular status: hemodynamically stable and blood pressure returned to baseline  Respiratory status: spontaneous ventilation, nonlabored ventilation and room air  Hydration status: stable  Comments: Ms. Amor Fix resting comfortably following procedure. Appropriate for discharge with .

## 2022-10-06 ENCOUNTER — HOSPITAL ENCOUNTER (OUTPATIENT)
Dept: WOMENS IMAGING | Age: 42
End: 2022-10-06
Payer: COMMERCIAL

## 2022-10-06 ENCOUNTER — HOSPITAL ENCOUNTER (OUTPATIENT)
Dept: ULTRASOUND IMAGING | Age: 42
Discharge: HOME | End: 2022-10-06
Payer: COMMERCIAL

## 2022-10-06 DIAGNOSIS — R92.8 ABNORMAL MAMMOGRAM: ICD-10-CM

## 2022-10-06 PROCEDURE — 76642 ULTRASOUND BREAST LIMITED: CPT

## 2022-11-01 ENCOUNTER — APPOINTMENT (OUTPATIENT)
Dept: GENERAL RADIOLOGY | Age: 42
End: 2022-11-01
Payer: COMMERCIAL

## 2022-11-01 ENCOUNTER — ANESTHESIA EVENT (OUTPATIENT)
Dept: OPERATING ROOM | Age: 42
End: 2022-11-01
Payer: COMMERCIAL

## 2022-11-01 ENCOUNTER — HOSPITAL ENCOUNTER (OUTPATIENT)
Age: 42
Setting detail: OUTPATIENT SURGERY
Discharge: HOME | End: 2022-11-01
Attending: ORTHOPAEDIC SURGERY | Admitting: ORTHOPAEDIC SURGERY
Payer: COMMERCIAL

## 2022-11-01 ENCOUNTER — HOSPITAL ENCOUNTER (EMERGENCY)
Age: 42
Discharge: HOME | End: 2022-11-01
Attending: EMERGENCY MEDICINE
Payer: COMMERCIAL

## 2022-11-01 ENCOUNTER — ANESTHESIA (OUTPATIENT)
Dept: OPERATING ROOM | Age: 42
End: 2022-11-01
Payer: COMMERCIAL

## 2022-11-01 ENCOUNTER — APPOINTMENT (OUTPATIENT)
Dept: GENERAL RADIOLOGY | Age: 42
End: 2022-11-01
Attending: ORTHOPAEDIC SURGERY
Payer: COMMERCIAL

## 2022-11-01 VITALS
TEMPERATURE: 97.1 F | DIASTOLIC BLOOD PRESSURE: 64 MMHG | OXYGEN SATURATION: 98 % | HEIGHT: 61 IN | BODY MASS INDEX: 31.34 KG/M2 | WEIGHT: 166.01 LBS | SYSTOLIC BLOOD PRESSURE: 106 MMHG | RESPIRATION RATE: 16 BRPM | HEART RATE: 67 BPM

## 2022-11-01 VITALS
OXYGEN SATURATION: 96 % | RESPIRATION RATE: 12 BRPM | HEIGHT: 61 IN | BODY MASS INDEX: 30.96 KG/M2 | WEIGHT: 164 LBS | HEART RATE: 78 BPM | DIASTOLIC BLOOD PRESSURE: 96 MMHG | TEMPERATURE: 97.9 F | SYSTOLIC BLOOD PRESSURE: 118 MMHG

## 2022-11-01 DIAGNOSIS — S52.252C: Primary | ICD-10-CM

## 2022-11-01 DIAGNOSIS — S52.601B TYPE I OR II OPEN FRACTURE OF DISTAL END OF RIGHT ULNA, UNSPECIFIED FRACTURE MORPHOLOGY, INITIAL ENCOUNTER: Primary | ICD-10-CM

## 2022-11-01 DIAGNOSIS — S62.616A CLOSED DISPLACED FRACTURE OF PROXIMAL PHALANX OF RIGHT LITTLE FINGER, INITIAL ENCOUNTER: ICD-10-CM

## 2022-11-01 LAB
GLUCOSE BLD-MCNC: 84 MG/DL (ref 70–99)
GLUCOSE BLD-MCNC: 99 MG/DL (ref 70–99)
PERFORMED ON: NORMAL
PERFORMED ON: NORMAL

## 2022-11-01 PROCEDURE — 7100000011 HC PHASE II RECOVERY - ADDTL 15 MIN: Performed by: ORTHOPAEDIC SURGERY

## 2022-11-01 PROCEDURE — 6360000002 HC RX W HCPCS: Performed by: NURSE ANESTHETIST, CERTIFIED REGISTERED

## 2022-11-01 PROCEDURE — 99219 PR INITIAL OBSERVATION CARE/DAY 50 MINUTES: CPT | Performed by: ORTHOPAEDIC SURGERY

## 2022-11-01 PROCEDURE — 2580000003 HC RX 258: Performed by: ORTHOPAEDIC SURGERY

## 2022-11-01 PROCEDURE — 6370000000 HC RX 637 (ALT 250 FOR IP): Performed by: EMERGENCY MEDICINE

## 2022-11-01 PROCEDURE — 3700000001 HC ADD 15 MINUTES (ANESTHESIA): Performed by: ORTHOPAEDIC SURGERY

## 2022-11-01 PROCEDURE — A4217 STERILE WATER/SALINE, 500 ML: HCPCS | Performed by: ORTHOPAEDIC SURGERY

## 2022-11-01 PROCEDURE — 6360000002 HC RX W HCPCS: Performed by: ANESTHESIOLOGY

## 2022-11-01 PROCEDURE — 2709999900 HC NON-CHARGEABLE SUPPLY: Performed by: ORTHOPAEDIC SURGERY

## 2022-11-01 PROCEDURE — 73110 X-RAY EXAM OF WRIST: CPT

## 2022-11-01 PROCEDURE — 3600000002 HC SURGERY LEVEL 2 BASE: Performed by: ORTHOPAEDIC SURGERY

## 2022-11-01 PROCEDURE — 7100000000 HC PACU RECOVERY - FIRST 15 MIN: Performed by: ORTHOPAEDIC SURGERY

## 2022-11-01 PROCEDURE — 7100000010 HC PHASE II RECOVERY - FIRST 15 MIN: Performed by: ORTHOPAEDIC SURGERY

## 2022-11-01 PROCEDURE — 73130 X-RAY EXAM OF HAND: CPT

## 2022-11-01 PROCEDURE — 11012 DEB SKIN BONE AT FX SITE: CPT | Performed by: ORTHOPAEDIC SURGERY

## 2022-11-01 PROCEDURE — 3700000000 HC ANESTHESIA ATTENDED CARE: Performed by: ORTHOPAEDIC SURGERY

## 2022-11-01 PROCEDURE — 73100 X-RAY EXAM OF WRIST: CPT

## 2022-11-01 PROCEDURE — 7100000001 HC PACU RECOVERY - ADDTL 15 MIN: Performed by: ORTHOPAEDIC SURGERY

## 2022-11-01 PROCEDURE — 99284 EMERGENCY DEPT VISIT MOD MDM: CPT

## 2022-11-01 PROCEDURE — 6370000000 HC RX 637 (ALT 250 FOR IP): Performed by: ANESTHESIOLOGY

## 2022-11-01 PROCEDURE — 6360000002 HC RX W HCPCS: Performed by: EMERGENCY MEDICINE

## 2022-11-01 PROCEDURE — 2580000003 HC RX 258: Performed by: NURSE ANESTHETIST, CERTIFIED REGISTERED

## 2022-11-01 PROCEDURE — 2580000003 HC RX 258

## 2022-11-01 PROCEDURE — 3600000012 HC SURGERY LEVEL 2 ADDTL 15MIN: Performed by: ORTHOPAEDIC SURGERY

## 2022-11-01 PROCEDURE — 96372 THER/PROPH/DIAG INJ SC/IM: CPT

## 2022-11-01 PROCEDURE — 6360000002 HC RX W HCPCS: Performed by: ORTHOPAEDIC SURGERY

## 2022-11-01 PROCEDURE — 2500000003 HC RX 250 WO HCPCS: Performed by: NURSE ANESTHETIST, CERTIFIED REGISTERED

## 2022-11-01 RX ORDER — SODIUM CHLORIDE 9 MG/ML
INJECTION, SOLUTION INTRAVENOUS CONTINUOUS
Status: DISCONTINUED | OUTPATIENT
Start: 2022-11-01 | End: 2022-11-01 | Stop reason: HOSPADM

## 2022-11-01 RX ORDER — DEXAMETHASONE SODIUM PHOSPHATE 4 MG/ML
INJECTION, SOLUTION INTRA-ARTICULAR; INTRALESIONAL; INTRAMUSCULAR; INTRAVENOUS; SOFT TISSUE PRN
Status: DISCONTINUED | OUTPATIENT
Start: 2022-11-01 | End: 2022-11-01 | Stop reason: SDUPTHER

## 2022-11-01 RX ORDER — FENTANYL CITRATE 50 UG/ML
25 INJECTION, SOLUTION INTRAMUSCULAR; INTRAVENOUS EVERY 5 MIN PRN
Status: DISCONTINUED | OUTPATIENT
Start: 2022-11-01 | End: 2022-11-01 | Stop reason: HOSPADM

## 2022-11-01 RX ORDER — AMOXICILLIN AND CLAVULANATE POTASSIUM 875; 125 MG/1; MG/1
1 TABLET, FILM COATED ORAL EVERY 12 HOURS SCHEDULED
Status: DISCONTINUED | OUTPATIENT
Start: 2022-11-01 | End: 2022-11-01

## 2022-11-01 RX ORDER — OXYCODONE HYDROCHLORIDE 5 MG/1
5 TABLET ORAL
Status: COMPLETED | OUTPATIENT
Start: 2022-11-01 | End: 2022-11-01

## 2022-11-01 RX ORDER — WATER 1000 ML/1000ML
INJECTION, SOLUTION INTRAVENOUS
Status: COMPLETED
Start: 2022-11-01 | End: 2022-11-01

## 2022-11-01 RX ORDER — CEFAZOLIN SODIUM 1 G/3ML
INJECTION, POWDER, FOR SOLUTION INTRAMUSCULAR; INTRAVENOUS PRN
Status: DISCONTINUED | OUTPATIENT
Start: 2022-11-01 | End: 2022-11-01

## 2022-11-01 RX ORDER — LIDOCAINE HYDROCHLORIDE 20 MG/ML
INJECTION, SOLUTION EPIDURAL; INFILTRATION; INTRACAUDAL; PERINEURAL PRN
Status: DISCONTINUED | OUTPATIENT
Start: 2022-11-01 | End: 2022-11-01 | Stop reason: SDUPTHER

## 2022-11-01 RX ORDER — FENTANYL CITRATE 50 UG/ML
INJECTION, SOLUTION INTRAMUSCULAR; INTRAVENOUS PRN
Status: DISCONTINUED | OUTPATIENT
Start: 2022-11-01 | End: 2022-11-01 | Stop reason: SDUPTHER

## 2022-11-01 RX ORDER — PROCHLORPERAZINE EDISYLATE 5 MG/ML
5 INJECTION INTRAMUSCULAR; INTRAVENOUS
Status: DISCONTINUED | OUTPATIENT
Start: 2022-11-01 | End: 2022-11-01 | Stop reason: HOSPADM

## 2022-11-01 RX ORDER — KETOROLAC TROMETHAMINE 30 MG/ML
15 INJECTION, SOLUTION INTRAMUSCULAR; INTRAVENOUS ONCE
Status: COMPLETED | OUTPATIENT
Start: 2022-11-01 | End: 2022-11-01

## 2022-11-01 RX ORDER — AMOXICILLIN AND CLAVULANATE POTASSIUM 875; 125 MG/1; MG/1
1 TABLET, FILM COATED ORAL ONCE
Status: DISCONTINUED | OUTPATIENT
Start: 2022-11-01 | End: 2022-11-01

## 2022-11-01 RX ORDER — PROPOFOL 10 MG/ML
INJECTION, EMULSION INTRAVENOUS PRN
Status: DISCONTINUED | OUTPATIENT
Start: 2022-11-01 | End: 2022-11-01 | Stop reason: SDUPTHER

## 2022-11-01 RX ORDER — ONDANSETRON 2 MG/ML
INJECTION INTRAMUSCULAR; INTRAVENOUS PRN
Status: DISCONTINUED | OUTPATIENT
Start: 2022-11-01 | End: 2022-11-01 | Stop reason: SDUPTHER

## 2022-11-01 RX ORDER — MIDAZOLAM HYDROCHLORIDE 1 MG/ML
INJECTION INTRAMUSCULAR; INTRAVENOUS PRN
Status: DISCONTINUED | OUTPATIENT
Start: 2022-11-01 | End: 2022-11-01 | Stop reason: SDUPTHER

## 2022-11-01 RX ORDER — CEFAZOLIN SODIUM 1 G/3ML
2000 INJECTION, POWDER, FOR SOLUTION INTRAMUSCULAR; INTRAVENOUS ONCE
Status: COMPLETED | OUTPATIENT
Start: 2022-11-01 | End: 2022-11-01

## 2022-11-01 RX ORDER — LABETALOL HYDROCHLORIDE 5 MG/ML
10 INJECTION, SOLUTION INTRAVENOUS
Status: DISCONTINUED | OUTPATIENT
Start: 2022-11-01 | End: 2022-11-01 | Stop reason: HOSPADM

## 2022-11-01 RX ORDER — ONDANSETRON 2 MG/ML
4 INJECTION INTRAMUSCULAR; INTRAVENOUS
Status: DISCONTINUED | OUTPATIENT
Start: 2022-11-01 | End: 2022-11-01 | Stop reason: HOSPADM

## 2022-11-01 RX ORDER — AMOXICILLIN AND CLAVULANATE POTASSIUM 875; 125 MG/1; MG/1
1 TABLET, FILM COATED ORAL 2 TIMES DAILY
Qty: 28 TABLET | Refills: 0 | Status: SHIPPED | OUTPATIENT
Start: 2022-11-01 | End: 2022-11-15

## 2022-11-01 RX ORDER — SODIUM CHLORIDE 9 MG/ML
INJECTION, SOLUTION INTRAVENOUS CONTINUOUS PRN
Status: DISCONTINUED | OUTPATIENT
Start: 2022-11-01 | End: 2022-11-01 | Stop reason: SDUPTHER

## 2022-11-01 RX ORDER — HYDROCODONE BITARTRATE AND ACETAMINOPHEN 5; 325 MG/1; MG/1
1 TABLET ORAL ONCE
Status: COMPLETED | OUTPATIENT
Start: 2022-11-01 | End: 2022-11-01

## 2022-11-01 RX ORDER — HYDRALAZINE HYDROCHLORIDE 20 MG/ML
10 INJECTION INTRAMUSCULAR; INTRAVENOUS
Status: DISCONTINUED | OUTPATIENT
Start: 2022-11-01 | End: 2022-11-01 | Stop reason: HOSPADM

## 2022-11-01 RX ORDER — HYDROCODONE BITARTRATE AND ACETAMINOPHEN 5; 325 MG/1; MG/1
1 TABLET ORAL EVERY 6 HOURS PRN
Qty: 20 TABLET | Refills: 0 | Status: SHIPPED | OUTPATIENT
Start: 2022-11-01 | End: 2022-11-06

## 2022-11-01 RX ORDER — LIDOCAINE HYDROCHLORIDE 10 MG/ML
1 INJECTION, SOLUTION EPIDURAL; INFILTRATION; INTRACAUDAL; PERINEURAL
Status: DISCONTINUED | OUTPATIENT
Start: 2022-11-01 | End: 2022-11-01 | Stop reason: HOSPADM

## 2022-11-01 RX ORDER — HYDROMORPHONE HCL 110MG/55ML
0.5 PATIENT CONTROLLED ANALGESIA SYRINGE INTRAVENOUS EVERY 5 MIN PRN
Status: DISCONTINUED | OUTPATIENT
Start: 2022-11-01 | End: 2022-11-01 | Stop reason: HOSPADM

## 2022-11-01 RX ADMIN — SODIUM CHLORIDE: 9 INJECTION, SOLUTION INTRAVENOUS at 11:51

## 2022-11-01 RX ADMIN — FENTANYL CITRATE 50 MCG: 50 INJECTION, SOLUTION INTRAMUSCULAR; INTRAVENOUS at 11:54

## 2022-11-01 RX ADMIN — LIDOCAINE HYDROCHLORIDE 100 MG: 20 INJECTION, SOLUTION EPIDURAL; INFILTRATION; INTRACAUDAL; PERINEURAL at 11:55

## 2022-11-01 RX ADMIN — PROPOFOL 200 MG: 10 INJECTION, EMULSION INTRAVENOUS at 11:56

## 2022-11-01 RX ADMIN — FENTANYL CITRATE 50 MCG: 50 INJECTION, SOLUTION INTRAMUSCULAR; INTRAVENOUS at 12:06

## 2022-11-01 RX ADMIN — CEFAZOLIN 2000 MG: 1 INJECTION, POWDER, FOR SOLUTION INTRAMUSCULAR; INTRAVENOUS at 06:17

## 2022-11-01 RX ADMIN — OXYCODONE 5 MG: 5 TABLET ORAL at 13:42

## 2022-11-01 RX ADMIN — DEXAMETHASONE SODIUM PHOSPHATE 8 MG: 4 INJECTION, SOLUTION INTRAMUSCULAR; INTRAVENOUS at 12:13

## 2022-11-01 RX ADMIN — HYDROCODONE BITARTRATE AND ACETAMINOPHEN 1 TABLET: 5; 325 TABLET ORAL at 04:37

## 2022-11-01 RX ADMIN — CEFAZOLIN 2000 MG: 2 INJECTION, POWDER, FOR SOLUTION INTRAMUSCULAR; INTRAVENOUS at 11:50

## 2022-11-01 RX ADMIN — KETOROLAC TROMETHAMINE 15 MG: 30 INJECTION, SOLUTION INTRAMUSCULAR; INTRAVENOUS at 06:18

## 2022-11-01 RX ADMIN — HYDROMORPHONE HYDROCHLORIDE 0.5 MG: 2 INJECTION, SOLUTION INTRAMUSCULAR; INTRAVENOUS; SUBCUTANEOUS at 12:55

## 2022-11-01 RX ADMIN — WATER: 1 INJECTION INTRAMUSCULAR; INTRAVENOUS; SUBCUTANEOUS at 06:21

## 2022-11-01 RX ADMIN — HYDROMORPHONE HYDROCHLORIDE 0.5 MG: 2 INJECTION, SOLUTION INTRAMUSCULAR; INTRAVENOUS; SUBCUTANEOUS at 13:06

## 2022-11-01 RX ADMIN — MIDAZOLAM 2 MG: 1 INJECTION INTRAMUSCULAR; INTRAVENOUS at 11:46

## 2022-11-01 RX ADMIN — ONDANSETRON 4 MG: 2 INJECTION INTRAMUSCULAR; INTRAVENOUS at 12:13

## 2022-11-01 ASSESSMENT — PAIN SCALES - GENERAL
PAINLEVEL_OUTOF10: 5
PAINLEVEL_OUTOF10: 7
PAINLEVEL_OUTOF10: 5
PAINLEVEL_OUTOF10: 7
PAINLEVEL_OUTOF10: 7
PAINLEVEL_OUTOF10: 4

## 2022-11-01 ASSESSMENT — PAIN - FUNCTIONAL ASSESSMENT: PAIN_FUNCTIONAL_ASSESSMENT: 0-10

## 2022-11-01 ASSESSMENT — PAIN DESCRIPTION - LOCATION
LOCATION: WRIST

## 2022-11-01 ASSESSMENT — PAIN DESCRIPTION - PAIN TYPE
TYPE: SURGICAL PAIN

## 2022-11-01 ASSESSMENT — PAIN DESCRIPTION - ORIENTATION
ORIENTATION: LEFT

## 2022-11-01 ASSESSMENT — PAIN DESCRIPTION - ONSET
ONSET: ON-GOING

## 2022-11-01 ASSESSMENT — PAIN DESCRIPTION - FREQUENCY
FREQUENCY: CONTINUOUS

## 2022-11-01 ASSESSMENT — PAIN DESCRIPTION - DESCRIPTORS
DESCRIPTORS: ACHING

## 2022-11-01 ASSESSMENT — ENCOUNTER SYMPTOMS
SHORTNESS OF BREATH: 0
COLOR CHANGE: 1

## 2022-11-01 NOTE — ED PROVIDER NOTES
[facility]  EMERGENCY DEPARTMENTENCOUNTER      Pt Name: Nicky Rees  MRN: [id number]  [place] 1980  Date ofevaluation: 11/1/2022  Provider: Courtney Calero MD    CHIEF COMPLAINT       Chief Complaint   Patient presents with    Animal Bite         HISTORY OF PRESENT ILLNESS   (Location/Symptom, Timing/Onset,Context/Setting, Quality, Duration, Modifying Factors, Severity)  Note limiting factors. Nicky Rees is a 38 y.o. female  who  has a past medical history of Anxiety, Closed displaced fracture of proximal phalanx of right little finger, Diabetes mellitus ([address] Mixed hyperlipidemia, Mixed hyperlipidemia, Pyelonephritis, and Severe obesity (BMI 35.0-39. 9) with comorbidity ([address] who presents to the emergency department for evaluation of left arm injury, right hand injury, onset this laceration to the left breast.  Patient reports that around 0430 she was bit by her family dog. States that dogs vaccinations are up-to-date. States the dog was cleaned onto her arm and had bitten her most to the full-thickness of the arm. She reports feeling some paresthesias in the left hand. Denies loss of motor function. She states that her tetanus was approximately 4 years ago. Patient with multiple small lacerations on the left forearm with soft tissue swelling. There is a small laceration to the upper left breast.  Patient has a laceration to the right thumb. HPI    NursingNotes were reviewed. REVIEW OF SYSTEMS    (2-9 systems for level 4, 10 or more for level 5)     Review of Systems   Constitutional:  Negative for fever. Musculoskeletal:  Positive for arthralgias. Skin:  Positive for color change and wound. Neurological:  Negative for weakness and numbness. Except as noted above the remainder of the review of systems was reviewed and negative.        PAST MEDICAL HISTORY     Past Medical History:   Diagnosis Date    Anxiety     Closed displaced fracture of proximal phalanx of right little finger 7/17/2021    Diabetes mellitus ([facility]     GDM-glyburide    Mixed hyperlipidemia 1/17/2020    Mixed hyperlipidemia     Pyelonephritis 4/7/2016    Severe obesity (BMI 35.0-39. 9) with comorbidity ([facility] 1/17/2020         SURGICALHISTORY       Past Surgical History:   Procedure Laterality Date    COLONOSCOPY  04/2022    Dr. Hugh Shin    COLONOSCOPY N/A 4/21/2022    COLONOSCOPY POLYPECTOMY SNARE/COLD BIOPSY performed by Clay Bernardo MD at [address] [place] [address]       Discharge Medication List as of 11/1/2022  6:43 AM        CONTINUE these medications which have NOT CHANGED    Details   FLUoxetine (PROZAC) 20 MG capsule Take 20 mg by mouth dailyHistorical Med      Multiple Vitamins-Minerals (THERAPEUTIC MULTIVITAMIN-MINERALS) tablet Take 1 tablet by mouth dailyHistorical Med      Cholecalciferol (VITAMIN D) 50 MCG (2000 UT) CAPS capsule Take by mouth Unsure of dosageHistorical Med      cetirizine (ZYRTEC) 10 MG tablet Take 10 mg by mouth dailyHistorical Med      ibuprofen (ADVIL;MOTRIN) 200 MG tablet Take 400 mg by mouth every 6 hours as needed for PainHistorical Med                  No known allergies    FAMILY HISTORY       Family History   Problem Relation Age of Onset    Cancer Paternal Grandfather         lung    Diabetes Paternal Grandfather     Diabetes Maternal Cousin     High Blood Pressure Mother     High Cholesterol Mother     Osteoporosis Mother     High Blood Pressure Father     Diabetes Father     Alcohol Abuse Brother     Osteoporosis Maternal Grandmother     Early Death Maternal Grandfather     Cancer Maternal Grandfather           SOCIAL HISTORY       Social History     Socioeconomic History    Marital status:    Tobacco Use    Smoking status: Some Days     Packs/day: 0.25     Years: 20.00     Pack years: 5.00     Types: Cigarettes     Last attempt to quit: 2/7/2018     Years since quitting: 4.7    Smokeless tobacco: Never    Tobacco comments:     pack a week   Vaping Use    Vaping Use: Never used   Substance and Sexual Activity    Alcohol use: Yes     Comment: weekends    Drug use: No    Sexual activity: Yes     Partners: Male       SCREENINGS    Delmy Coma Scale  Eye Opening: Spontaneous  Best Verbal Response: Oriented  Best Motor Response: Obeys commands  Delmy Coma Scale Score: 15        PHYSICAL EXAM    (up to 7 for level 4, 8 or more for level 5)     ED Triage Vitals   BP Temp Temp src Heart Rate Resp SpO2 Height Weight   11/01/22 0405 11/01/22 0413 -- 11/01/22 0413 11/01/22 0413 11/01/22 0413 11/01/22 0413 11/01/22 0413   103/76 97.1 °F (36.2 °C)  67 16 97 % 5' 1\" (1.549 m) 166 lb 0.1 oz (75.3 kg)       Physical Exam  Vitals reviewed. Constitutional:       Appearance: She is well-developed. HENT:      Head: Normocephalic and atraumatic. Mouth/Throat:      Mouth: Mucous membranes are moist.   Eyes:      Extraocular Movements: Extraocular movements intact. Conjunctiva/sclera: Conjunctivae normal.      Pupils: Pupils are equal, round, and reactive to light. Neck:      Trachea: No tracheal deviation. Cardiovascular:      Rate and Rhythm: Normal rate and regular rhythm. Heart sounds: Normal heart sounds. Pulmonary:      Effort: Pulmonary effort is normal.      Breath sounds: Normal breath sounds. Abdominal:      General: There is no distension. Palpations: Abdomen is soft. Tenderness: There is no abdominal tenderness. Musculoskeletal:         General: Swelling, tenderness, deformity and signs of injury present. Normal range of motion. Cervical back: Normal range of motion. Skin:     General: Skin is warm and dry. Findings: Bruising, erythema and lesion present. Comments: Multiple 1 cm to centimeter lacerations on the left forearm. No pulsatile bleeding. No expanding hematoma. Compartments soft.     Neurological:      General: No focal Patient was given thefollowing medications:  Medications   HYDROcodone-acetaminophen (NORCO) 5-325 MG per tablet 1 tablet (1 tablet Oral Given 11/1/22 3527)   ceFAZolin (ANCEF) injection 2,000 mg (2,000 mg IntraMUSCular Given 11/1/22 0617)   ketorolac (TORADOL) injection 15 mg (15 mg IntraMUSCular Given 11/1/22 0618)   sterile water injection (  Given 11/1/22 2724)       ED COURSE & MEDICAL DECISION MAKING    Pertinent Labs & Imaging studies reviewed. (See chart for details)   -  Patient seen and evaluated in the emergency department. -  Triage and nursing notes reviewed and incorporated. -  Old chart records reviewed and incorporated. -  Differential diagnosis includes: Differential diagnosis: includes but not limited to Arterial Injury/Ischemia, Fracture, Dislocation, Infection, Compartment Syndrome, Neurologic Deficit/Injury. -  Work-up included:  See above  -  ED treatment included: See above  -  Results discussed with patient. CT for evaluation of forearm and wrist pain after dog bite. Reports that the dog involved is a [employer]. Stated the dog was clamped down on her arm for prolonged amount of time. On exam patient has multiple lacerations over the forearm the largest of which is approximately 3 cm. Motor function and sensation intact in the radial ulnar median nerves distally. X-rays obtained demonstrated a comminuted ulnar fracture with radial styloid fracture. Due to the mechanism of the patient's injury most concerned for open fracture. Discussed with patient the need for orthopedic consultation and she informed me that she would prefer Dr. Jennifer Arevalo. I did reach out to Dr. Jennifer Arevalo. Doing the patient's images and pictures the patient's wounds he did recommend the patient present to [facility] for cleanout for concerns of open fracture. He recommended patient receive 2 g of IM Ancef.   Plan of care discussed with patient and she did demonstrate understanding and is comfortable with plan of care. Patient was splinted and discharged with instructions. patient feels improved on reevaluation. Symptomatic treatment with expectant management discussed with the patient and they and/or family members present are amenable to treatment plan and outpatient follow-up. Strict return precautions were discussed with the patient and those present. They demonstrated understanding of when to return to the emergency department for new or worsening symptoms. .  The patient is agreeable with plan of care and disposition. Is this patient to be included in the SEP-1 Core Measure due to severe sepsis or septic shock? No   Exclusion criteria - the patient is NOT to be included for SEP-1 Core Measure due to:  2+ SIRS criteria are not met      REASSESSMENT          CRITICAL CARE TIME   Total Critical Care time was 10 minutes, excluding separately reportable procedures. There was a high probability of clinically significant/life threatening deterioration in the patient's condition which required my urgent intervention. CONSULTS:  None    PROCEDURES:  Unless otherwise noted below, none     Procedures    FINAL IMPRESSION      1.  Type I or II open fracture of distal end of right ulna, unspecified fracture morphology, initial encounter          DISPOSITION/PLAN   DISPOSITION Decision To Discharge 11/01/2022 06:09:40 AM      PATIENT REFERREDTO:  Peteinga VegaEVE - CNP  [address] [facility]  [phone]    Schedule an appointment as soon as possible for a visit   As needed      DISCHARGEMEDICATIONS:  Discharge Medication List as of 11/1/2022  6:43 AM             (Please note that portions of this note were completed with a voice recognition program.  Efforts were made to edit the dictations but occasionally words are mis-transcribed.)    Terrence Eason MD (electronically signed)  Attending Emergency Physician          Terrence Eason MD  11/01/22 0326 [address] MD  11/16/22 9691

## 2022-11-01 NOTE — PROGRESS NOTES
Pt arrived from OR to PACU bay 8. Report received from OR staff. Pt arousable to voice. Surgical incisions dressings in place to L wrist. Pt on 6L simple mask, NSR, and VSS. Will continue to monitor.

## 2022-11-01 NOTE — DISCHARGE INSTRUCTIONS
Post op instruction:  1- D/C home  2- Dx Left wrist open fracture. 3- NWB left wrist  4- Elevation surgical site, with ice  5- Keep splint dry and clean  6- F/U in 10 days. Pedrito Botello MD, 11/1/2022      ORTHOPEDIC/PODIATRY DISCHARGE INSTRUCTIONS    Follow your surgeons instructions. Make follow-up appointment. Observe operative area for signs of excessive bleeding such as a slow general ooze that saturates the dressing or bright red bleeding. In either case, apply pressure to the area and elevate if possible and call your surgeon right away. Observe the affected extremity for circulation or nerve impairment such as a change in color, numbness, tingling, coldness or increased pain. If any of these symptoms are present call your surgeon. Observe operative site for any signs of infection such as increased pain, redness, fever greater than 101 degrees, swelling, foul odor or drainage. Contact surgeon if any of these symptoms are present. If you become short of breath call your surgeon or go to the nearest emergency room. Remove dressing if directed by surgeon. Leave steristips or sutures or staples in place. You may loosen your ace wrap if it feels too tight, or if you have severe pain, or if it has swelling. Elevate extremity as directed by surgeon. You may shower when directed by surgeon. Use ice pack as directed by surgeon. Do not use heat. Avoid stress to suture line such as pulling, pushing or tugging. Use sling as instructed by surgeon. Take medications as ordered. Take pain medication with food. Do not drive or operate machinery while taking narcotics. Call your surgeon for any questions or problems. ANESTHESIA DISCHARGE INSTRUCTIONS    Wear your seatbelt home. You are under the influence of drugs-do not drink alcohol, drive, operate machinery, make any important decisions or sign any legal documents for 24 hours.   Children should not ride bikes, [place] or play on gym sets for 24 hours after surgery. A responsible adult needs to be with you for 24 hours. You may experience lightheadedness, dizziness, or sleepiness following surgery. Rest at home today- increase activity as tolerated. Progress slowly to a regular diet unless your physician has instructed you otherwise. Drink plenty of water. If persistent nausea and vomiting becomes a problem, call your physician. Coughing, sore throat and muscle aches are other side effects of anesthesia, and should improve with time. Do not drive or operate machinery while taking narcotics. Females of childbearing potential and on hormonal birth control, should use two forms of contraception following procedure if given a medication called sugammadex and/or emend. Additional contraception should be used for 7 days for sugammadex and/or 28 days for emend. These medications have a potential to reduce the effectiveness of hormonal birth control.

## 2022-11-01 NOTE — H&P
[facility] Orthopedic Surgery  Consult Note         This patient is seen in consultation at the request of Dr Tl Neal MD    Reason for Consult:  Left wrist pain/ dog bite/ minimally displaced distal radius and ulna shaft G III open fracture. CHIEF COMPLAINT:  Left wrist pain/ dog bite. History Obtained From:  patient, electronic medical record    HISTORY OF PRESENT ILLNESS:   Ms. Zak Franco is a 38 y.o.  female right handed who presents today for consultation and evaluation of a left wrist injury. The patient reports that this injury occurred when she got bitten by her dog early this morning around 4:30 AM.  She was first seen and evaluated in [place] ED, when she was x-rayed and and I was consulted. The patient denies any other injuries. Rates pain a 8/10 VAS moderate, sharp, constant and show no change. Movement makes the pain worse, the splint and resting makes the pain better. Alleviating factors elevation and rest. Reported numbness sensation small finger. Past Medical History:        Diagnosis Date    Anxiety     Closed displaced fracture of proximal phalanx of right little finger 7/17/2021    Diabetes mellitus ([address]     GDM-glyburide    Mixed hyperlipidemia 1/17/2020    Mixed hyperlipidemia     Pyelonephritis 4/7/2016    Severe obesity (BMI 35.0-39. 9) with comorbidity ([address] 1/17/2020       Past Surgical History:        Procedure Laterality Date    COLONOSCOPY  04/2022    Dr. Sean Aguilar    COLONOSCOPY N/A 4/21/2022    COLONOSCOPY POLYPECTOMY SNARE/COLD BIOPSY performed by Eliseo Matamoros MD at [facility] [address] Left 11/1/2022    DEBRIDEMENT OF LEFT WRIST OPEN FRACTURE WITH MINI SEBAS performed by Maurizio Carnes MD at [facility]       Medications prior to admission:   Prior to Admission medications    Medication Sig Start Date End Date Taking?  Authorizing Provider   FLUoxetine (PROZAC) 20 MG capsule Take 20 mg by mouth daily Historical Provider, MD   Multiple Vitamins-Minerals (THERAPEUTIC MULTIVITAMIN-MINERALS) tablet Take 1 tablet by mouth daily    Historical Provider, MD   Cholecalciferol (VITAMIN D) 50 MCG ( UT) CAPS capsule Take by mouth Unsure of dosage    Historical Provider, MD   cetirizine (ZYRTEC) 10 MG tablet Take 10 mg by mouth daily    Historical Provider, MD   ibuprofen (ADVIL;MOTRIN) 200 MG tablet Take 400 mg by mouth every 6 hours as needed for Pain    Historical Provider, MD       Current Medications:   No current facility-administered medications for this encounter.     Allergies:  No known allergies    Social History     Socioeconomic History    Marital status:      Spouse name: Not on file    Number of children: Not on file    Years of education: Not on file    Highest education level: Not on file   Occupational History    Not on file   Tobacco Use    Smoking status: Some Days     Packs/day: 0.25     Years: 20.00     Pack years: 5.00     Types: Cigarettes     Last attempt to quit: 2018     Years since quittin.7    Smokeless tobacco: Never    Tobacco comments:     pack a week   Vaping Use    Vaping Use: Never used   Substance and Sexual Activity    Alcohol use: Yes     Comment: weekends    Drug use: No    Sexual activity: Yes     Partners: Male   Other Topics Concern    Not on file   Social History Narrative    Not on file     Social Determinants of Health     Financial Resource Strain: Not on file   Food Insecurity: Not on file   Transportation Needs: Not on file   Physical Activity: Not on file   Stress: Not on file   Social Connections: Not on file   Intimate Partner Violence: Not on file   Housing Stability: Not on file       Family History:  Family History   Problem Relation Age of Onset    Cancer Paternal Grandfather         lung    Diabetes Paternal Grandfather     Diabetes Maternal Cousin     High Blood Pressure Mother     High Cholesterol Mother     Osteoporosis Mother     High Blood Pressure Father     Diabetes Father     Alcohol Abuse Brother     Osteoporosis Maternal Grandmother     Early Death Maternal Grandfather     Cancer Maternal Grandfather          History reviewed and no pertinent family history. REVIEW OF SYSTEMS:   CONSTITUTIONAL: Denies unexplained weight loss, fevers, chills or fatigue  NEUROLOGICAL: Denies unsteady gait or progressive weakness    PSYCHOLOGICAL: Denies anxiety, depression   SKIN: Denies skin changes, delayed healing, rash, itching   HEMATOLOGIC: Denies easy bleeding or bruising  ENDOCRINE: Denies excessive thirst, urination, heat/cold  RESPIRATORY: Denies current dyspnea, cough  CARDIOVASCULAR: Negative for chest pain at this time. EYES: Negative for photophobia and visual disturbance. ENT:  Negative for rhinorrhea, epistaxis, sore throat, or hearing loss. GI: Denies nausea, vomiting, diarrhea   : Denies bowel or bladder issues   MUSCULOSKELETAL: Left wrist pain/ dog bite. All other ROS reviewed in chart or with patient or family and are grossly negative. PHYSICAL EXAMINATION:  Ms. Mikal Castelan is a very pleasant 38 y.o. female who seen today in no acute distress, awake, alert, and oriented. She is well nourished and groomed. Patient with normal affect. Body mass index is 30.99 kg/m². . Skin warm and dry. Resting respiratory rate is 16. Resp deep and easy. Pulse is with regular rate and rhythm    BP (!) 120/94   Pulse (!) 108   Temp 98.9 °F (37.2 °C) (Temporal)   Resp 20   Ht 5' 1\" (1.549 m)   Wt 164 lb (74.4 kg)   SpO2 98%   BMI 30.99 kg/m²        Airway is intact  Chest: chest clear, no wheezing, rales, normal symmetric air entry, no tachypnea, retractions or cyanosis  Heart: regular rate and rhythm ; heart sounds normal   Hearing intact, pupil equal and reactive bilateral  Lymphatics; No groin or axillary enlarged lymph nodes. Neck; No swelling  Abdomen; soft, non distended.      MUSCULOSKELETAL: On evaluation of her left upper extremity, there is minimal deformity. There is moderate swelling and moderate ecchymosis and multiple deep laceration from the dog bite volar and dorsal distal forearm and wrist.  She is tender to palpation over the distal radius and ulna shaft, and otherwise nontender over the remainder of the extremity. Range of motion is decreased secondary to pain over the left wrist, but no mechanical block. Distal pulses are 2+ and symmetric bilaterally. Sensation is grossly intact to light touch except small finger. NEUROLOGIC:   Sensory:    Touch:                     Right Upper Extremity:  normal                   Left Upper Extremity:  abnormal - decrease sensation ulnar side left hand. Right Lower Extremity:  normal                  Left Lower Extremity:  normal        DATA:    CBC:   Lab Results   Component Value Date/Time    WBC 11.7 12/04/2021 01:40 PM    RBC 4.42 12/04/2021 01:40 PM    HGB 13.1 12/04/2021 01:40 PM    HCT 39.7 12/04/2021 01:40 PM    MCV 89.7 12/04/2021 01:40 PM    MCH 29.7 12/04/2021 01:40 PM    MCHC 33.1 12/04/2021 01:40 PM    RDW 12.5 12/04/2021 01:40 PM     12/04/2021 01:40 PM    MPV 7.9 12/04/2021 01:40 PM     WBC:    Lab Results   Component Value Date/Time    WBC 11.7 12/04/2021 01:40 PM     PT/INR:    Lab Results   Component Value Date/Time    PROTIME 10.1 04/12/2016 01:00 PM    INR 0.89 04/12/2016 01:00 PM     PTT:    Lab Results   Component Value Date/Time    APTT 29.7 04/12/2016 01:00 PM   [APTT    IMAGING: Xrays dated 11/1/2022, 3 views of left wrist were reviewed, and showed minimally displaced distal radius and ulna shaft fracture. IMPRESSION: Left wrist pain/ dog bite/ minimally displaced distal radius and ulna shaft G III open fracture. PLAN:  I discussed with Joe Mayberry the overall alignment of the open fracture and treatment options including both surgical and non-surgical treatment, and that my recommendation is an open débridement of the fracture. I discussed the risks and benefits of surgery with the patient, including but not limited to infection, bleeding, pain, injury to nerves or blood vessels failure of the surgery and need for additional surgery. All the patient's questions were answered. We discussed an expected post-operative course. She  is understanding of this and wishes to proceed. Thank you very much for the kind consultation and allowing me to participate in this patient's care. I will continue to keep you apprised of her progress.         Himanshu Uribe MD   11/1/2022  11:47 AM

## 2022-11-01 NOTE — H&P
Preoperative H&P Update    The patient's History and Physical in the medical record from 11/1/2022 was reviewed by me today. Past Medical History:   Diagnosis Date    Anxiety     Closed displaced fracture of proximal phalanx of right little finger 7/17/2021    Diabetes mellitus ([facility]     GDM-glyburide    Mixed hyperlipidemia 1/17/2020    Mixed hyperlipidemia     Pyelonephritis 4/7/2016    Severe obesity (BMI 35.0-39. 9) with comorbidity ([facility] [facility] 1/17/2020     Past Surgical History:   Procedure Laterality Date    COLONOSCOPY  04/2022    Dr. Stern Sonam    COLONOSCOPY N/A 4/21/2022    COLONOSCOPY POLYPECTOMY SNARE/COLD BIOPSY performed by Anibal Mauricio MD at [address] [facility]       No current facility-administered medications on file prior to encounter. Current Outpatient Medications on File Prior to Encounter   Medication Sig Dispense Refill    FLUoxetine (PROZAC) 20 MG capsule Take 20 mg by mouth daily      Multiple Vitamins-Minerals (THERAPEUTIC MULTIVITAMIN-MINERALS) tablet Take 1 tablet by mouth daily      Cholecalciferol (VITAMIN D) 50 MCG (2000 UT) CAPS capsule Take by mouth Unsure of dosage      cetirizine (ZYRTEC) 10 MG tablet Take 10 mg by mouth daily      ibuprofen (ADVIL;MOTRIN) 200 MG tablet Take 400 mg by mouth every 6 hours as needed for Pain         Allergies   Allergen Reactions    No Known Allergies      05/18/2017 - 7/31/17, 9/11/17 10/12/2016 - ls 08/31/2016 -  05/19/2016 - ls 04/18/2016 -  04/12/2016 -  04/04/2016 -  03/07/2016 - ls 02/11/2016 - ls 02/04/2016 - ls 11/20/2015 -  10/26/2015 -  09/15/2015 -        I reviewed the HPI, medications, allergies, reason for surgery, diagnosis and treatment plan and there has been no change. The patient was evaluated by me today.  Physical exam findings for this update include:    Vitals:    11/01/22 1115   BP: (!) 120/94   Pulse: (!) 108   Resp: 20   Temp: 98.9 °F (37.2 °C)   SpO2: 98%     Airway is intact  Chest: chest clear, no wheezing, rales, normal symmetric air entry, no tachypnea, retractions or cyanosis  Heart: regular rate and rhythm ; heart sounds normal  Findings on exam of the body region where surgery is to be performed include:  Left ulna and radius open fracture, dog bite    Electronically signed by Nieves Tello MD on 11/1/2022 at 11:48 AM

## 2022-11-01 NOTE — ANESTHESIA PRE PROCEDURE
Department of Anesthesiology  Preprocedure Note       Name:  Baljeet Ballesteros   Age:  38 y.o.  :  1980                                          MRN:  [id number]         Date:  2022      Surgeon: Jl Hill):  Rafal Monzon MD    Procedure: Procedure(s):  DEBRIDEMENT OF LEFT WRIST OPEN FRACTURE, POSSIBLE OPEN REDUCTION INTERNAL FIXATION LEFT DISTAL RADIUS AND ULNA WITH MINI SEBAS    Medications prior to admission:   Prior to Admission medications    Medication Sig Start Date End Date Taking? Authorizing Provider   FLUoxetine (PROZAC) 20 MG capsule Take 20 mg by mouth daily    Historical Provider, MD   Multiple Vitamins-Minerals (THERAPEUTIC MULTIVITAMIN-MINERALS) tablet Take 1 tablet by mouth daily    Historical Provider, MD   Cholecalciferol (VITAMIN D) 50 MCG ( UT) CAPS capsule Take by mouth Unsure of dosage    Historical Provider, MD   cetirizine (ZYRTEC) 10 MG tablet Take 10 mg by mouth daily    Historical Provider, MD   ibuprofen (ADVIL;MOTRIN) 200 MG tablet Take 400 mg by mouth every 6 hours as needed for Pain    Historical Provider, MD       Current medications:    No current facility-administered medications for this encounter. Allergies: Allergies   Allergen Reactions    No Known Allergies      2017 - 17, 9/11/17 10/12/2016 - ls 2016 -  2016 - ls 2016 -  2016 -  2016 -  2016 - ls 2016 - ls 2016 - ls 2015 -  10/26/2015 -  09/15/2015 -         Problem List:    Patient Active Problem List   Diagnosis Code    Tobacco use disorder F17.200    Rosacea L71.9    Bronchospasm, acute J98.01    Pyelonephritis N12    Gestational diabetes mellitus treated with oral hypoglycemic therapy O24.415    High-risk pregnancy in third trimester O09.93    Obesity (BMI 30.0-34. 9) E66.9    Mixed hyperlipidemia E78.2    Closed displaced fracture of proximal phalanx of right little finger S62.000V       Past Medical History: Diagnosis Date    Anxiety     Closed displaced fracture of proximal phalanx of right little finger 2021    Diabetes mellitus ([facility]     GDM-glyburide    Mixed hyperlipidemia 2020    Mixed hyperlipidemia     Pyelonephritis 2016    Severe obesity (BMI 35.0-39. 9) with comorbidity ([facility] 2020       Past Surgical History:        Procedure Laterality Date    COLONOSCOPY  2022    Dr. Kaleb Luong    COLONOSCOPY N/A 2022    COLONOSCOPY POLYPECTOMY SNARE/COLD BIOPSY performed by Ashish Acuña MD at [address] [place] [address]         Social History:    Social History     Tobacco Use    Smoking status: Some Days     Packs/day: 0.25     Years: 20.00     Pack years: 5.00     Types: Cigarettes     Last attempt to quit: 2018     Years since quittin.7    Smokeless tobacco: Never    Tobacco comments:     pack a week   Substance Use Topics    Alcohol use: Yes     Comment: weekends                                Ready to quit: Not Answered  Counseling given: Not Answered  Tobacco comments: pack a week      Vital Signs (Current):   Vitals:    22 1115   BP: (!) 120/94   Pulse: (!) 108   Resp: 20   Temp: 98.9 °F (37.2 °C)   TempSrc: Temporal   SpO2: 98%   Weight: 164 lb (74.4 kg)   Height: 5' 1\" (1.549 m)                                              BP Readings from Last 3 Encounters:   22 (!) 120/94   22 106/64   22 125/88       NPO Status:                                                                                 BMI:   Wt Readings from Last 3 Encounters:   22 164 lb (74.4 kg)   22 166 lb 0.1 oz (75.3 kg)   22 167 lb 12.8 oz (76.1 kg)     Body mass index is 30.99 kg/m².     CBC:   Lab Results   Component Value Date/Time    WBC 11.7 2021 01:40 PM    RBC 4.42 2021 01:40 PM    HGB 13.1 2021 01:40 PM    HCT 39.7 2021 01:40 PM    MCV 89.7 2021 01:40 PM    RDW 12.5 2021 01:40 PM  12/04/2021 01:40 PM       CMP:   Lab Results   Component Value Date/Time     12/04/2021 01:40 PM    K 3.9 12/04/2021 01:40 PM     12/04/2021 01:40 PM    CO2 23 12/04/2021 01:40 PM    BUN 11 12/04/2021 01:40 PM    CREATININE 0.6 12/04/2021 01:40 PM    GFRAA >60 12/04/2021 01:40 PM    GFRAA >60 12/01/2012 05:06 PM    AGRATIO 1.6 12/04/2021 01:40 PM    LABGLOM >60 12/04/2021 01:40 PM    GLUCOSE 94 12/04/2021 01:40 PM    PROT 6.7 12/04/2021 01:40 PM    PROT 6.2 10/17/2012 08:29 PM    CALCIUM 9.3 12/04/2021 01:40 PM    BILITOT 0.7 12/04/2021 01:40 PM    ALKPHOS 75 12/04/2021 01:40 PM    AST 16 12/04/2021 01:40 PM    ALT 33 12/04/2021 01:40 PM       POC Tests: No results for input(s): POCGLU, POCNA, POCK, POCCL, POCBUN, POCHEMO, POCHCT in the last 72 hours.     Coags:   Lab Results   Component Value Date/Time    PROTIME 10.1 04/12/2016 01:00 PM    INR 0.89 04/12/2016 01:00 PM    APTT 29.7 04/12/2016 01:00 PM       HCG (If Applicable):   Lab Results   Component Value Date    PREGTESTUR Negative 04/21/2022        ABGs: No results found for: PHART, PO2ART, DJF6RRQ, OSZ3QLA, BEART, N7ETKHNR     Type & Screen (If Applicable):  No results found for: LABABO, LABRH    Drug/Infectious Status (If Applicable):  No results found for: HIV, HEPCAB    COVID-19 Screening (If Applicable): No results found for: COVID19        Anesthesia Evaluation  Patient summary reviewed and Nursing notes reviewed no history of anesthetic complications:   Airway: Mallampati: I  TM distance: >3 FB   Neck ROM: full  Mouth opening: > = 3 FB   Dental: normal exam         Pulmonary:       (-) asthma and shortness of breath                           Cardiovascular:    (+) hyperlipidemia    (-) hypertension and  angina                Neuro/Psych:   (+) depression/anxiety    (-) CVA           GI/Hepatic/Renal:        (-) GERD and liver disease       Endo/Other:    (+) DiabetesType II DM, , .    (-) hypothyroidism               Abdominal: Vascular:     - PVD. Other Findings:           Anesthesia Plan      general     ASA 2       Induction: intravenous. MIPS: Postoperative opioids intended and Prophylactic antiemetics administered. Anesthetic plan and risks discussed with patient. Use of blood products discussed with patient whom. Plan discussed with CRNA.                     Martha Dozier MD   11/1/2022

## 2022-11-01 NOTE — DISCHARGE INSTRUCTIONS
Per Dr. Keerthi Arredondo instructions present to Andres Din at 56.   Do not eat or drink anything before arrival.

## 2022-11-01 NOTE — PROGRESS NOTES
Discharge instructions reviewed with patient and daughter Gisella Bernardo. All home medications have been reviewed, pt v/u. Discharge instructions signed. Pt discharged via wheelchair. Pt discharged with sling and all belongings. Alona taking stable pt home.

## 2022-11-01 NOTE — ANESTHESIA POSTPROCEDURE EVALUATION
Department of Anesthesiology  Postprocedure Note    Patient: Keyla Sue  MRN: [id number]  YOB: 1980  Date of evaluation: 11/1/2022      Procedure Summary     Date: 11/01/22 Room / Location: [address]    Anesthesia Start: 1616 Anesthesia Stop: 9258    Procedure: DEBRIDEMENT OF LEFT WRIST OPEN FRACTURE WITH MINI SEBAS (Left: Wrist) Diagnosis:       Wrist fracture, left, open, initial encounter      (OPEN FRACTURE LEFT WRIST)    Surgeons: Sandro Luna MD Responsible Provider: aJnine Reis MD    Anesthesia Type: general ASA Status: 2          Anesthesia Type: No value filed.     Marialuisa Phase I: Marialuisa Score: 8    Marialuisa Phase II:        Anesthesia Post Evaluation    Patient location during evaluation: PACU  Patient participation: complete - patient participated  Level of consciousness: awake and alert  Airway patency: patent  Nausea & Vomiting: no nausea and no vomiting  Complications: no  Cardiovascular status: hemodynamically stable  Respiratory status: acceptable  Hydration status: stable  Multimodal analgesia pain management approach

## 2022-11-02 NOTE — OP NOTE
[facility]                     [facility], [address]                                OPERATIVE REPORT    PATIENT NAME: My Zamora                   :        1980  MED REC NO:   [id number]                          ROOM:  ACCOUNT NO:   [de-identified]                           ADMIT DATE: 2022  PROVIDER:     Claudia Vazquez MD    DATE OF PROCEDURE:  2022    PRIMARY CARE PROVIDER:  Gregg Davalos CNP    PREOPERATIVE DIAGNOSES:  1. Left wrist grade 3 open fracture, distal ulna shaft as well as the  radius. 2.  Multiple wound from a dog bite. POSTOPERATIVE DIAGNOSES:  1. Left wrist grade 3 open fracture, distal ulna shaft as well as the  radius. 2.  Multiple wound from a dog bite. OPERATION PERFORMED:  Debridement of open fracture from a dog bite, left  distal ulnar shaft as well as distal radius fracture with grade 3 open  with excisional debridement of skin, subcutaneous tissue, fascia and  bone. SURGEON:  Claudia Vazquez MD    ASSISTANT:  Khadijah Arguello CNP. ANESTHESIA:  General anesthesia. ESTIMATED BLOOD LOSS:  Minimal.    COMPLICATIONS:  None. TOURNIQUET:  Left upper arm, 250 mmHg. 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 She is right-hand dominant. She was in her  room 4 o'clock in the morning and her dog came into the room when she  was delightment with her  and she gets emotional at that time and  the dog jumped on her and bite her left wrist.  She had significant  injury with the dog who not get go off the arm. Then she got a break in  both the radius and the ulna.   She then came to emergency room at [facility] and I was consulted for her injury upon her request.  All risks,  benefits, and alternatives were discussed with the patient and she  agreed to proceed with the surgical treatment. Given the patient's Body mass index is 30.99 kg/m². And multiple open wounds added significant challenge to the procedure. It required significant physical and mental effort. It required 60% more time for such procedure. DESCRIPTION OF THE PROCEDURE:  The patient's left hand was marked. She  received 2 gm Ancef IV preoperatively. The patient was then brought to  the operating room and underwent general anesthesia. A well-padded  tourniquet was placed in the left upper arm. The left upper extremity  was then prepped and draped in a regular sterile routine fashion. A  time-out was called confirming the patient name, site and procedure. The left arm was elevated for a few minutes and tourniquet was inflated  to 250 mmHg. We started debridement of the distal radius first.  She  has multiple open wounds. All these wounds were sharply debrided with  the knife and scissors, removed all the unviable soft tissue. We did  excisional debridement of skin, subcutaneous tissue, and fascia all the  way down to the bone using a curette. We then irrigated all these  wounds both the radius as well as the ulna with the 3 L of normal saline  mixed with gentamicin. After we finished the thorough debridement and  removed all unviable tissue, we let the tourniquet down and hemostasis  was secured. We did check the alignment with the mini C-arm and what  was found to be an acceptable good position of the ulna. The decision  was made to proceed with the treatment without internal fixation. At  this point, we had one of the incisions that we extended more than the  extent of the wound, partially closed with one suture of 4-0 nylon and  the rest of the wounds were kept open to allow for any needed drainage  from the injury. Dressings were then applied in the form of Xeroform,  4x4, sterile Webril and a splint was applied.     The patient tolerated the procedure well, was taken to the recovery in  stable condition. Saroj Dong CNP was 1st Assist given the nature of the procedure that needed advanced assistance. She assisted in all aspect of the procedure, including but limited to draping in a sterile fashion, exposure of surgical area, controlling bleeding, retracting and protecting important structures, achieve and maintain bone reduction and surgical wound closure with dressing application. POSTOPERATIVE PLAN:  The patient will be discharged home. She will be  nonweightbearing in the left upper extremity. We will start antibiotic  for two weeks. The patient will be seen in the office in 10 days. We  will check the wound and get another x-ray for alignment and wound check  and see if she would need open reduction at that time or not.         Srinivas Asencio MD    D: 11/01/2022 15:42:51       T: 11/01/2022 23:51:06     [email or web address]  Job#: [id number]     Doc#: [id number]    CC:  Velma Goodrich Cnp

## 2022-11-04 ENCOUNTER — TELEPHONE (OUTPATIENT)
Dept: ORTHOPEDIC SURGERY | Age: 42
End: 2022-11-04

## 2022-11-04 NOTE — TELEPHONE ENCOUNTER
Patient is asking if she should be concerned her fingers are numb and turning white. She has been taking Norco and ibuprofen. She also wants to know if she should be taking her bandages off and cleaning wounds.

## 2022-11-04 NOTE — TELEPHONE ENCOUNTER
Patient is concerned that her fingers are white. She reports her pinky has been numb since the injury, but other fingers are now going numb which is new. She also states the palm of her hand feels numb at times. She is reporting swelling, but states the dressing is loose enough that she can still fit 2 fingers underneath it comfortably. Asked patient to check for capillary refill. She states the skin does become more white, and returns to the baseline whiteness quickly. [facility] was notified. Offered patient an appointment today, but she declined due to difficulty driving in by herself. She is going to monitor, loosen the ace dressing, and report if her skin starts to become cold to touch.

## 2022-11-09 ENCOUNTER — OFFICE VISIT (OUTPATIENT)
Dept: ORTHOPEDIC SURGERY | Age: 42
End: 2022-11-09
Payer: COMMERCIAL

## 2022-11-09 ENCOUNTER — TELEPHONE (OUTPATIENT)
Dept: ORTHOPEDIC SURGERY | Age: 42
End: 2022-11-09

## 2022-11-09 VITALS — WEIGHT: 164 LBS | HEIGHT: 61 IN | BODY MASS INDEX: 30.96 KG/M2

## 2022-11-09 DIAGNOSIS — F17.200 CURRENT SMOKER: ICD-10-CM

## 2022-11-09 DIAGNOSIS — S52.692A OTHER CLOSED FRACTURE OF DISTAL END OF LEFT ULNA, INITIAL ENCOUNTER: Primary | ICD-10-CM

## 2022-11-09 PROBLEM — S52.602A CLOSED FRACTURE OF LOWER END OF LEFT ULNA: Status: ACTIVE | Noted: 2022-11-09

## 2022-11-09 PROCEDURE — L3908 WHO COCK-UP NONMOLDE PRE OTS: HCPCS | Performed by: ORTHOPAEDIC SURGERY

## 2022-11-09 PROCEDURE — 99406 BEHAV CHNG SMOKING 3-10 MIN: CPT | Performed by: ORTHOPAEDIC SURGERY

## 2022-11-09 PROCEDURE — 99214 OFFICE O/P EST MOD 30 MIN: CPT | Performed by: ORTHOPAEDIC SURGERY

## 2022-11-09 RX ORDER — AMOXICILLIN AND CLAVULANATE POTASSIUM 875; 125 MG/1; MG/1
1 TABLET, FILM COATED ORAL 2 TIMES DAILY
Qty: 20 TABLET | Refills: 0 | Status: SHIPPED | OUTPATIENT
Start: 2022-11-09 | End: 2022-11-19

## 2022-11-09 RX ORDER — HYDROCODONE BITARTRATE AND ACETAMINOPHEN 5; 325 MG/1; MG/1
1 TABLET ORAL EVERY 6 HOURS PRN
Qty: 20 TABLET | Refills: 0 | Status: SHIPPED | OUTPATIENT
Start: 2022-11-09 | End: 2022-11-14

## 2022-11-09 NOTE — PROGRESS NOTES
Name_______________________________________Printed:____________________  Date and time of surgery____11/10/2022________0730____________Arrival Time:___0600_____________   1. The instructions given regarding when and if a patient needs to stop oral intake prior to surgery varies. Follow the specific instructions you were given                  _x__Nothing to eat or to drink after Midnight the night before.                   ____Carbo loading or ERAS instructions will be given to select patients-if you have been given those instructions -please do the following                           The evening before your surgery after dinner before midnight drink 40 ounces of gatorade. If you are diabetic use sugar free. The morning of surgery drink 40 ounces of water. This needs to be finished 3 hours prior to your surgery start time. 2. Take the following pills with a small sip of water on the morning of surgery___________________________________________________                  Do not take blood pressure medications ending in pril or sartan the maren prior to surgery or the morning of surgery_   3. Aspirin, Ibuprofen, Advil, Naproxen, Vitamin E and other Anti-inflammatory products and supplements should be stopped for 5 -7days before surgery or as directed by your physician. 4. Check with your Doctor regarding stopping Plavix, Coumadin,Eliquis, Lovenox,Effient,Pradaxa,Xarelto, Fragmin or other blood thinners and follow their instructions. 5. Do not smoke, and do not drink any alcoholic beverages 24 hours prior to surgery. This includes NA Beer. Refrain from the usage of any recreational drugs. 6. You may brush your teeth and gargle the morning of surgery. DO NOT SWALLOW WATER   7. You MUST make arrangements for a responsible adult to stay on site while you are here and take you home after your surgery. You will not be allowed to leave alone or drive yourself home.   It is strongly suggested someone stay with you the first 24 hrs. Your surgery will be cancelled if you do not have a ride home. 8. A parent/legal guardian must accompany a child scheduled for surgery and plan to stay at the hospital until the child is discharged. Please do not bring other children with you. 9. Please wear simple, loose fitting clothing to the hospital.  David Gallagher not bring valuables (money, credit cards, checkbooks, etc.) Do not wear any makeup (including no eye makeup) or nail polish on your fingers or toes. 10. DO NOT wear any jewelry or piercings on day of surgery. All body piercing jewelry must be removed. 11. If you have ___dentures, they will be removed before going to the OR; we will provide you a container. If you wear ___contact lenses or ___glasses, they will be removed; please bring a case for them. 12. Please see your family doctor/pediatrician for a history & physical and/or concerning medications. Bring any test results/reports from your physician's office. PCP__________________Phone___________H&P Appt. Date________             13 If you  have a Living Will and Durable Power of  for Healthcare, please bring in a copy. 15. Notify your Surgeon if you develop any illness between now and surgery  time, cough, cold, fever, sore throat, nausea, vomiting, etc.  Please notify your surgeon if you experience dizziness, shortness of breath or blurred vision between now & the time of your surgery             15. DO NOT shave your operative site 96 hours prior to surgery. For face & neck surgery, men may use an electric razor 48 hours prior to surgery. 16. Shower the night before or morning of surgery using an antibacterial soap or as you have been instructed. 17. To provide excellent care visitors will be limited to one in the room at any given time. 18.  Please bring picture ID and insurance card.              19.  Visit our web site for additional information:  [email or web address]              20.During flu season no children under the age of 15 are permitted in the hospital for the safety of all patients. 21. If you take a long acting insulin in the evening only  take half of your usual  dose the night  before your procedure              22. If you use a c-pap please bring DOS if staying overnight,             23.For your convenience [facility] has a pharmacy on site to fill your prescriptions. 24. If you use oxygen and have a portable tank please bring it  with you the DOS             25. Bring a complete list of all your medications with name and dose include any supplements. 26. Other__________________________________________   *Please call pre admission testing if you any further questions   [facility]   [address] [place] [address]. [place]  [phone]   [address] [place] [address]       VISITOR POLICY(subject to change)    Current policy is 2 visitors per patient. No children. Mask is  at the discretion of the facility. Visiting hours are 8a-8p. Overnight visitors will be at the discretion of the nurse. All policies subject to change. All above information reviewed with patient in person or by phone. Patient verbalizes understanding. All questions and concerns addressed.                                                                                                  Patient/Rep___patient_________________                                                                                                                                    PRE OP INSTRUCTIONS

## 2022-11-09 NOTE — PROGRESS NOTES
CHIEF COMPLAINT:   1-Left wrist pain/  displaced distal radius and ulna open fracture, s/p debridement. 2-Left wrist and hand dog bite, open wounds    DATE OF INJURY: 11/1/2022    HISTORY:  Ms. Bear Zaragoza is a 38 y.o.  female right handed who presents today for evaluation of a left wrist injury. The patient reports that this injury occurred when she was attacked by her dog when she was in an argument with her  at 4 AM.  She was first seen and evaluated in [id number] [place] [address] ER, when she was x-rayed and splinted, and asked to f/u with Orthopedics. The patient denies any other injuries. Rates pain a 5-7/10 VAS moderate, aching, throbbing, tender, constant and show no change. Movement makes the pain worse, the splint and resting makes the pain better. Alleviating factors rest. No numbness or tingling sensation. She is on Augmentin. She works in IT. She is a smoker. Past Medical History:   Diagnosis Date    Anxiety     Closed displaced fracture of proximal phalanx of right little finger 7/17/2021    Diabetes mellitus ([facility] [address]     GDM-glyburide    Mixed hyperlipidemia 1/17/2020    Mixed hyperlipidemia     Pyelonephritis 4/7/2016    Severe obesity (BMI 35.0-39. 9) with comorbidity ([address] 1/17/2020       Past Surgical History:   Procedure Laterality Date    COLONOSCOPY  04/2022    Dr. Kaylee Jackson    COLONOSCOPY N/A 4/21/2022    COLONOSCOPY POLYPECTOMY SNARE/COLD BIOPSY performed by Daisha Hunt MD at [facility] Left 11/1/2022    DEBRIDEMENT OF LEFT WRIST OPEN FRACTURE WITH MINI SEBAS performed by Doug Hunt MD at [address] [place] [address] History     Socioeconomic History    Marital status:      Spouse name: Not on file    Number of children: Not on file    Years of education: Not on file    Highest education level: Not on file   Occupational History    Not on file   Tobacco Use    Smoking status: Some Days     Packs/day: 0.25 Years: 20.00     Pack years: 5.00     Types: Cigarettes     Last attempt to quit: 2018     Years since quittin.7    Smokeless tobacco: Never    Tobacco comments:     pack a week   Vaping Use    Vaping Use: Never used   Substance and Sexual Activity    Alcohol use: Yes     Comment: weekends    Drug use: No    Sexual activity: Yes     Partners: Male   Other Topics Concern    Not on file   Social History Narrative    Not on file     Social Determinants of Health     Financial Resource Strain: Not on file   Food Insecurity: Not on file   Transportation Needs: Not on file   Physical Activity: Not on file   Stress: Not on file   Social Connections: Not on file   Intimate Partner Violence: Not on file   Housing Stability: Not on file       Family History   Problem Relation Age of Onset    Cancer Paternal Grandfather         lung    Diabetes Paternal Grandfather     Diabetes Maternal Cousin     High Blood Pressure Mother     High Cholesterol Mother     Osteoporosis Mother     High Blood Pressure Father     Diabetes Father     Alcohol Abuse Brother     Osteoporosis Maternal Grandmother     Early Death Maternal Grandfather     Cancer Maternal Grandfather        Current Outpatient Medications on File Prior to Visit   Medication Sig Dispense Refill    amoxicillin-clavulanate (AUGMENTIN) 875-125 MG per tablet Take 1 tablet by mouth 2 times daily for 14 days 28 tablet 0    FLUoxetine (PROZAC) 20 MG capsule Take 20 mg by mouth daily      Multiple Vitamins-Minerals (THERAPEUTIC MULTIVITAMIN-MINERALS) tablet Take 1 tablet by mouth daily      Cholecalciferol (VITAMIN D) 50 MCG (2000 UT) CAPS capsule Take by mouth Unsure of dosage      cetirizine (ZYRTEC) 10 MG tablet Take 10 mg by mouth daily      ibuprofen (ADVIL;MOTRIN) 200 MG tablet Take 400 mg by mouth every 6 hours as needed for Pain       No current facility-administered medications on file prior to visit.        Pertinent items are noted in HPI  Review of systems reviewed from Patient History Form and available in the patient's chart under the Media tab. PHYSICAL EXAMINATION:  Ms. Valentina Sorensen is a very pleasant 38 y.o.  female who presents today in no acute distress, awake, alert, and oriented. She is well dressed, nourished and  groomed. Patient with normal affect. Height is  5' 1\" (1.549 m), weight is 164 lb (74.4 kg), Body mass index is 30.99 kg/m². Resting respiratory rate is 16. On evaluation of her left upper extremity, there is minimal deformity. There is moderate swelling and minimal ecchymosis. She is tender to palpation over the distal radius, and otherwise nontender over the remainder of the extremity. Range of motion is decreased secondary to pain over the left wrist, but no mechanical block. The skin overlying the left wrist with multiple dog bite stab wounds with some sutured. Distal pulses are 2+ and symmetric bilaterally. Sensation is grossly intact to light touch and symmetric bilaterally. IMAGING:  Xrays dated today in office, 3 views of left wrist were reviewed, and showed  displaced distal radius and ulna fracture. IMPRESSION:    1-Left displaced distal radius and ulna open fracture. 2-Left wrist and hand dog bite, open wounds    PLAN:  I discussed with Nura Shannon the overall alignment of the fracture and treatment options including both surgical and non-surgical treatment, and that my recommendation is an open reduction and internal fixation given the amount of displacement and comminution of the fracture. I discussed the risks and benefits of surgery with the patient, including but not limited to infection, bleeding, pain, injury to nerves or blood vessels failure of the surgery and need for additional surgery. All the patient's questions were answered. We discussed an expected post-operative course. She  is understanding of this and wishes to proceed.   She was placed in a brace nonweightbearing left arm. Rx given for Augmentin and instructed in care. Plan on surgery Thursday at [facility]IF distal ulna. The patient smokes cigarettes, and we discussed with the patient the risks of smoking on general health and also on bone and soft tissue healing (delay and non-union), and promised to cut down or stop smoking. Smoking: Educated the patient regarding the hazards of smoking and that it harms their body in many ways. It increases the chance of developing heart disease, lung disease, cancer, and other health problems including poor bone and wound healing. The importance of smoking cessation for optimal bone and wound healing was stressed. This was communicated verbally, 5 Minutes. As this patient has demonstrated risk factors for osteoporosis, such as age greater than [de-identified] years and evidence of a fracture, I have referred the patient back to the primary care physician for evaluation for osteoporosis, including consideration for DEXA scanning, if this is felt to be clinically indicated. The patient is advised to contact the primary care physician to follow-up for further evaluation. Procedures    Jany Osorio Titan Wrist Long Forearm Brace     Patient was prescribed a Jany Osorio Titan Wrist and Forearm Brace. The left wrist will require stabilization / immobilization from this semi-rigid / rigid orthosis to improve their function. The orthosis will assist in protecting the affected area, provide functional support and facilitate healing. The patient was educated and fit by a healthcare professional with expert knowledge and specialization in brace application while under the direct supervision of the treating physician. Verbal and written instructions for the use of and application of this item were provided.    They were instructed to contact the office immediately should the brace result in increased pain, decreased sensation, increased swelling or worsening of the condition.        Keshia Woods MD

## 2022-11-09 NOTE — TELEPHONE ENCOUNTER
Auth: NPR  Date: 11/10/22  Reference # [id number]  Spoke with: Online  Type of SX: Outpatient  Location: [facility]  CPT: 92544   DX: [phone]  SX area:  [facility]  Insurance: [facility]

## 2022-11-09 NOTE — LETTER
[address] [facility]  Surgery Scheduling Form:      DEMOGRAPHICS:                                                                                                                  Patient Name:  Marta Rocha  Patient :  1980   Patient SS#:      Patient Phone:  [phone] (home)  Alt. Patient Phone:    Patient Address:  [facility] [address] [id number]    PCP:  EVE Toney CNP    Insurance ID Number:  Payer/Plan Subscr  Sex Relation Sub. Ins. ID Effective Group Num   1. [address] -* [address] [facility] 1980 Female Self [id number]                                    [address] [id number]         DIAGNOSIS & PROCEDURE:                                                                                                Diagnosis:   left distal ulna fracture  S52.692A  Operation:  Open reduction internal fixation left distal ulna [address]  [place] [facility] [place]  [facility]  [facility]      [facility] INFORMATION:                                                                                         .    Requested Date:   Thursday 11/10/22   OR Time:  7:30                      Patient Arrival Time:  6:00  OR Time Required:  30 Minutes  Anesthesia:  General  Equipment:  Dirk  Mini C-Arm:  Yes   Standard C-Arm:  No  Status:  Outpatient  PAT Required:  Yes  Comments:         Wilma Kearns MD     22           Pre Operative Physician Prophylaxis Orders - SCIP Protocols      Patient: Marta Rocha  :    1980    Procedure: 11-10-22 Open reduction internal fixation left distal ulna 02048    HEIGHT:  Ht Readings from Last 1 Encounters:   22 5' 1\" (1.549 m)                      WEIGHT:  Wt Readings from Last 1 Encounters:   22 164 lb (74.4 kg)         History & Physical:  By Surgeon    Pre-Operative Antibiotic Order:     []  ----  No Antibiotic Ordered       [x]  ----  Give the following Antibiotic within 1 hour prior to start time: Cefazolin 2g IV <119.9kg (264lbs) OR 3g if >120kg ([id number])       or      Clindamycin 900 mg IV (over 1 hour) if patient is allergic to           PENICILLINS or CAPHALOSPORIN       VTE prophylaxis [ ] Thigh hi  TEDS  [ ]  Knee Hi TEDS [ ] Foot Pumps [ ] Compression Devices      Physician Signature:     Date: 11/9/22  Time: 8:36 AM

## 2022-11-10 ENCOUNTER — HOSPITAL ENCOUNTER (OUTPATIENT)
Age: 42
Setting detail: OUTPATIENT SURGERY
Discharge: HOME | End: 2022-11-10
Attending: ORTHOPAEDIC SURGERY | Admitting: ORTHOPAEDIC SURGERY
Payer: COMMERCIAL

## 2022-11-10 ENCOUNTER — ANESTHESIA EVENT (OUTPATIENT)
Dept: OPERATING ROOM | Age: 42
End: 2022-11-10
Payer: COMMERCIAL

## 2022-11-10 ENCOUNTER — APPOINTMENT (OUTPATIENT)
Dept: GENERAL RADIOLOGY | Age: 42
End: 2022-11-10
Attending: ORTHOPAEDIC SURGERY
Payer: COMMERCIAL

## 2022-11-10 ENCOUNTER — ANESTHESIA (OUTPATIENT)
Dept: OPERATING ROOM | Age: 42
End: 2022-11-10
Payer: COMMERCIAL

## 2022-11-10 ENCOUNTER — TELEPHONE (OUTPATIENT)
Dept: ORTHOPEDIC SURGERY | Age: 42
End: 2022-11-10

## 2022-11-10 VITALS
TEMPERATURE: 97.4 F | HEIGHT: 60 IN | HEART RATE: 65 BPM | WEIGHT: 166 LBS | BODY MASS INDEX: 32.59 KG/M2 | OXYGEN SATURATION: 98 % | SYSTOLIC BLOOD PRESSURE: 142 MMHG | RESPIRATION RATE: 12 BRPM | DIASTOLIC BLOOD PRESSURE: 96 MMHG

## 2022-11-10 PROBLEM — S52.252B: Status: ACTIVE | Noted: 2022-11-01

## 2022-11-10 LAB — HCG(URINE) PREGNANCY TEST: NEGATIVE

## 2022-11-10 PROCEDURE — 3700000001 HC ADD 15 MINUTES (ANESTHESIA): Performed by: ORTHOPAEDIC SURGERY

## 2022-11-10 PROCEDURE — 3700000000 HC ANESTHESIA ATTENDED CARE: Performed by: ORTHOPAEDIC SURGERY

## 2022-11-10 PROCEDURE — 84703 CHORIONIC GONADOTROPIN ASSAY: CPT

## 2022-11-10 PROCEDURE — 2709999900 HC NON-CHARGEABLE SUPPLY: Performed by: ORTHOPAEDIC SURGERY

## 2022-11-10 PROCEDURE — 6360000002 HC RX W HCPCS: Performed by: NURSE ANESTHETIST, CERTIFIED REGISTERED

## 2022-11-10 PROCEDURE — A4217 STERILE WATER/SALINE, 500 ML: HCPCS | Performed by: ORTHOPAEDIC SURGERY

## 2022-11-10 PROCEDURE — C1769 GUIDE WIRE: HCPCS | Performed by: ORTHOPAEDIC SURGERY

## 2022-11-10 PROCEDURE — 2500000003 HC RX 250 WO HCPCS: Performed by: ORTHOPAEDIC SURGERY

## 2022-11-10 PROCEDURE — C1713 ANCHOR/SCREW BN/BN,TIS/BN: HCPCS | Performed by: ORTHOPAEDIC SURGERY

## 2022-11-10 PROCEDURE — 73100 X-RAY EXAM OF WRIST: CPT

## 2022-11-10 PROCEDURE — 6370000000 HC RX 637 (ALT 250 FOR IP): Performed by: ANESTHESIOLOGY

## 2022-11-10 PROCEDURE — 2580000003 HC RX 258: Performed by: ORTHOPAEDIC SURGERY

## 2022-11-10 PROCEDURE — 6360000002 HC RX W HCPCS: Performed by: ORTHOPAEDIC SURGERY

## 2022-11-10 PROCEDURE — 7100000011 HC PHASE II RECOVERY - ADDTL 15 MIN: Performed by: ORTHOPAEDIC SURGERY

## 2022-11-10 PROCEDURE — 3600000004 HC SURGERY LEVEL 4 BASE: Performed by: ORTHOPAEDIC SURGERY

## 2022-11-10 PROCEDURE — 25545 OPTX ULNAR SHFT FX INT FIXJ: CPT | Performed by: ORTHOPAEDIC SURGERY

## 2022-11-10 PROCEDURE — 6360000002 HC RX W HCPCS

## 2022-11-10 PROCEDURE — 7100000000 HC PACU RECOVERY - FIRST 15 MIN: Performed by: ORTHOPAEDIC SURGERY

## 2022-11-10 PROCEDURE — 3600000014 HC SURGERY LEVEL 4 ADDTL 15MIN: Performed by: ORTHOPAEDIC SURGERY

## 2022-11-10 PROCEDURE — 25545 OPTX ULNAR SHFT FX INT FIXJ: CPT | Performed by: NURSE PRACTITIONER

## 2022-11-10 PROCEDURE — 2720000010 HC SURG SUPPLY STERILE: Performed by: ORTHOPAEDIC SURGERY

## 2022-11-10 PROCEDURE — 7100000010 HC PHASE II RECOVERY - FIRST 15 MIN: Performed by: ORTHOPAEDIC SURGERY

## 2022-11-10 PROCEDURE — 6360000002 HC RX W HCPCS: Performed by: ANESTHESIOLOGY

## 2022-11-10 PROCEDURE — 2500000003 HC RX 250 WO HCPCS: Performed by: NURSE ANESTHETIST, CERTIFIED REGISTERED

## 2022-11-10 PROCEDURE — 7100000001 HC PACU RECOVERY - ADDTL 15 MIN: Performed by: ORTHOPAEDIC SURGERY

## 2022-11-10 DEVICE — LOCKING SCREW, FULLY THREADED,T8
Type: IMPLANTABLE DEVICE | Site: WRIST | Status: FUNCTIONAL
Brand: VARIAX

## 2022-11-10 DEVICE — BONE SCREW, FULLY THREADED, T8
Type: IMPLANTABLE DEVICE | Site: WRIST | Status: FUNCTIONAL
Brand: VARIAX

## 2022-11-10 DEVICE — KIRSCHNER WIRE
Type: IMPLANTABLE DEVICE | Site: WRIST | Status: FUNCTIONAL
Brand: VARIAX

## 2022-11-10 RX ORDER — HYDROMORPHONE HCL 110MG/55ML
0.25 PATIENT CONTROLLED ANALGESIA SYRINGE INTRAVENOUS EVERY 5 MIN PRN
Status: DISCONTINUED | OUTPATIENT
Start: 2022-11-10 | End: 2022-11-10 | Stop reason: HOSPADM

## 2022-11-10 RX ORDER — DEXAMETHASONE SODIUM PHOSPHATE 4 MG/ML
INJECTION, SOLUTION INTRA-ARTICULAR; INTRALESIONAL; INTRAMUSCULAR; INTRAVENOUS; SOFT TISSUE PRN
Status: DISCONTINUED | OUTPATIENT
Start: 2022-11-10 | End: 2022-11-10 | Stop reason: SDUPTHER

## 2022-11-10 RX ORDER — SODIUM CHLORIDE 9 MG/ML
INJECTION, SOLUTION INTRAVENOUS CONTINUOUS
Status: DISCONTINUED | OUTPATIENT
Start: 2022-11-10 | End: 2022-11-10 | Stop reason: HOSPADM

## 2022-11-10 RX ORDER — PROPOFOL 10 MG/ML
INJECTION, EMULSION INTRAVENOUS PRN
Status: DISCONTINUED | OUTPATIENT
Start: 2022-11-10 | End: 2022-11-10 | Stop reason: SDUPTHER

## 2022-11-10 RX ORDER — LIDOCAINE HYDROCHLORIDE 20 MG/ML
INJECTION, SOLUTION INFILTRATION; PERINEURAL PRN
Status: DISCONTINUED | OUTPATIENT
Start: 2022-11-10 | End: 2022-11-10 | Stop reason: SDUPTHER

## 2022-11-10 RX ORDER — OXYCODONE HYDROCHLORIDE 5 MG/1
5 TABLET ORAL
Status: COMPLETED | OUTPATIENT
Start: 2022-11-10 | End: 2022-11-10

## 2022-11-10 RX ORDER — FENTANYL CITRATE 50 UG/ML
INJECTION, SOLUTION INTRAMUSCULAR; INTRAVENOUS PRN
Status: DISCONTINUED | OUTPATIENT
Start: 2022-11-10 | End: 2022-11-10 | Stop reason: SDUPTHER

## 2022-11-10 RX ORDER — ONDANSETRON 2 MG/ML
INJECTION INTRAMUSCULAR; INTRAVENOUS PRN
Status: DISCONTINUED | OUTPATIENT
Start: 2022-11-10 | End: 2022-11-10 | Stop reason: SDUPTHER

## 2022-11-10 RX ORDER — BUPIVACAINE HYDROCHLORIDE 5 MG/ML
INJECTION, SOLUTION EPIDURAL; INTRACAUDAL
Status: COMPLETED | OUTPATIENT
Start: 2022-11-10 | End: 2022-11-10

## 2022-11-10 RX ORDER — SODIUM CHLORIDE 9 MG/ML
INJECTION, SOLUTION INTRAVENOUS PRN
Status: DISCONTINUED | OUTPATIENT
Start: 2022-11-10 | End: 2022-11-10 | Stop reason: HOSPADM

## 2022-11-10 RX ORDER — SODIUM CHLORIDE 0.9 % (FLUSH) 0.9 %
5-40 SYRINGE (ML) INJECTION PRN
Status: DISCONTINUED | OUTPATIENT
Start: 2022-11-10 | End: 2022-11-10 | Stop reason: HOSPADM

## 2022-11-10 RX ORDER — MIDAZOLAM HYDROCHLORIDE 1 MG/ML
INJECTION INTRAMUSCULAR; INTRAVENOUS PRN
Status: DISCONTINUED | OUTPATIENT
Start: 2022-11-10 | End: 2022-11-10 | Stop reason: SDUPTHER

## 2022-11-10 RX ORDER — ONDANSETRON 2 MG/ML
4 INJECTION INTRAMUSCULAR; INTRAVENOUS
Status: DISCONTINUED | OUTPATIENT
Start: 2022-11-10 | End: 2022-11-10 | Stop reason: HOSPADM

## 2022-11-10 RX ORDER — SODIUM CHLORIDE 0.9 % (FLUSH) 0.9 %
5-40 SYRINGE (ML) INJECTION EVERY 12 HOURS SCHEDULED
Status: DISCONTINUED | OUTPATIENT
Start: 2022-11-10 | End: 2022-11-10 | Stop reason: HOSPADM

## 2022-11-10 RX ORDER — LABETALOL HYDROCHLORIDE 5 MG/ML
5 INJECTION, SOLUTION INTRAVENOUS
Status: DISCONTINUED | OUTPATIENT
Start: 2022-11-10 | End: 2022-11-10 | Stop reason: HOSPADM

## 2022-11-10 RX ORDER — HYDROMORPHONE HCL 110MG/55ML
0.5 PATIENT CONTROLLED ANALGESIA SYRINGE INTRAVENOUS EVERY 5 MIN PRN
Status: COMPLETED | OUTPATIENT
Start: 2022-11-10 | End: 2022-11-10

## 2022-11-10 RX ORDER — HYDROMORPHONE HCL 110MG/55ML
PATIENT CONTROLLED ANALGESIA SYRINGE INTRAVENOUS
Status: COMPLETED
Start: 2022-11-10 | End: 2022-11-10

## 2022-11-10 RX ADMIN — LIDOCAINE HYDROCHLORIDE 100 MG: 20 INJECTION, SOLUTION INFILTRATION; PERINEURAL at 07:49

## 2022-11-10 RX ADMIN — SODIUM CHLORIDE: 9 INJECTION, SOLUTION INTRAVENOUS at 06:56

## 2022-11-10 RX ADMIN — HYDROMORPHONE HYDROCHLORIDE 0.5 MG: 2 INJECTION, SOLUTION INTRAMUSCULAR; INTRAVENOUS; SUBCUTANEOUS at 09:50

## 2022-11-10 RX ADMIN — DEXAMETHASONE SODIUM PHOSPHATE 8 MG: 4 INJECTION, SOLUTION INTRAMUSCULAR; INTRAVENOUS at 08:09

## 2022-11-10 RX ADMIN — HYDROMORPHONE HYDROCHLORIDE 0.5 MG: 2 INJECTION, SOLUTION INTRAMUSCULAR; INTRAVENOUS; SUBCUTANEOUS at 09:10

## 2022-11-10 RX ADMIN — ONDANSETRON 4 MG: 2 INJECTION INTRAMUSCULAR; INTRAVENOUS at 08:09

## 2022-11-10 RX ADMIN — HYDROMORPHONE HYDROCHLORIDE 0.5 MG: 2 INJECTION, SOLUTION INTRAMUSCULAR; INTRAVENOUS; SUBCUTANEOUS at 09:18

## 2022-11-10 RX ADMIN — FENTANYL CITRATE 50 MCG: 50 INJECTION, SOLUTION INTRAMUSCULAR; INTRAVENOUS at 07:47

## 2022-11-10 RX ADMIN — FENTANYL CITRATE 50 MCG: 50 INJECTION, SOLUTION INTRAMUSCULAR; INTRAVENOUS at 08:04

## 2022-11-10 RX ADMIN — PROPOFOL 200 MG: 10 INJECTION, EMULSION INTRAVENOUS at 07:50

## 2022-11-10 RX ADMIN — CEFAZOLIN 2000 MG: 2 INJECTION, POWDER, FOR SOLUTION INTRAMUSCULAR; INTRAVENOUS at 07:44

## 2022-11-10 RX ADMIN — MIDAZOLAM 2 MG: 1 INJECTION INTRAMUSCULAR; INTRAVENOUS at 07:42

## 2022-11-10 RX ADMIN — OXYCODONE 5 MG: 5 TABLET ORAL at 09:21

## 2022-11-10 RX ADMIN — HYDROMORPHONE HYDROCHLORIDE 0.5 MG: 2 INJECTION, SOLUTION INTRAMUSCULAR; INTRAVENOUS; SUBCUTANEOUS at 09:40

## 2022-11-10 ASSESSMENT — PAIN DESCRIPTION - LOCATION
LOCATION: ARM

## 2022-11-10 ASSESSMENT — PAIN DESCRIPTION - DESCRIPTORS
DESCRIPTORS: ACHING

## 2022-11-10 ASSESSMENT — PAIN SCALES - GENERAL
PAINLEVEL_OUTOF10: 8
PAINLEVEL_OUTOF10: 8
PAINLEVEL_OUTOF10: 10
PAINLEVEL_OUTOF10: 10
PAINLEVEL_OUTOF10: 2
PAINLEVEL_OUTOF10: 8
PAINLEVEL_OUTOF10: 10
PAINLEVEL_OUTOF10: 10

## 2022-11-10 ASSESSMENT — PAIN DESCRIPTION - ORIENTATION
ORIENTATION: LEFT
ORIENTATION: RIGHT
ORIENTATION: LEFT
ORIENTATION: LEFT

## 2022-11-10 ASSESSMENT — PAIN - FUNCTIONAL ASSESSMENT: PAIN_FUNCTIONAL_ASSESSMENT: 0-10

## 2022-11-10 NOTE — H&P
Preoperative H&P Update    The patient's History and Physical in the medical record from 11/9/2022 was reviewed by me today. Past Medical History:   Diagnosis Date    Anxiety     Closed displaced fracture of proximal phalanx of right little finger 7/17/2021    Diabetes mellitus ([facility] [address]     GDM-glyburide    Mixed hyperlipidemia 1/17/2020    Mixed hyperlipidemia     Pyelonephritis 4/7/2016    Severe obesity (BMI 35.0-39. 9) with comorbidity ([facility] [address] 1/17/2020     Past Surgical History:   Procedure Laterality Date    COLONOSCOPY  04/2022    Dr. Fariha Alvarenga    COLONOSCOPY N/A 4/21/2022    COLONOSCOPY POLYPECTOMY SNARE/COLD BIOPSY performed by Heidi Moss MD at [facility] [address] Left 11/1/2022    DEBRIDEMENT OF LEFT WRIST OPEN FRACTURE WITH MINI SEBAS performed by Devin Martinez MD at [address]     No current facility-administered medications on file prior to encounter. Current Outpatient Medications on File Prior to Encounter   Medication Sig Dispense Refill    Calcium 500-100 MG-UNIT CHEW Take 1 tablet by mouth      amoxicillin-clavulanate (AUGMENTIN) 875-125 MG per tablet Take 1 tablet by mouth 2 times daily for 10 days 20 tablet 0    HYDROcodone-acetaminophen (NORCO) 5-325 MG per tablet Take 1 tablet by mouth every 6 hours as needed for Pain for up to 5 days.  20 tablet 0    amoxicillin-clavulanate (AUGMENTIN) 875-125 MG per tablet Take 1 tablet by mouth 2 times daily for 14 days 28 tablet 0    Multiple Vitamins-Minerals (THERAPEUTIC MULTIVITAMIN-MINERALS) tablet Take 1 tablet by mouth daily      Cholecalciferol (VITAMIN D) 50 MCG (2000 UT) CAPS capsule Take by mouth Unsure of dosage      ibuprofen (ADVIL;MOTRIN) 200 MG tablet Take 400 mg by mouth every 6 hours as needed for Pain         Allergies   Allergen Reactions    No Known Allergies      05/18/2017 - 7/31/17, 9/11/17 10/12/2016 - ls 08/31/2016 -  05/19/2016 - ls 04/18/2016 -  04/12/2016 - 04/04/2016 -  03/07/2016 - ls 02/11/2016 - ls 02/04/2016 - ls 11/20/2015 -  10/26/2015 -  09/15/2015 -        I reviewed the HPI, medications, allergies, reason for surgery, diagnosis and treatment plan and there has been no change. The patient was evaluated by me today. Physical exam findings for this update include: There were no vitals filed for this visit. Airway is intact  Chest: chest clear, no wheezing, rales, normal symmetric air entry, no tachypnea, retractions or cyanosis  Heart: regular rate and rhythm ; heart sounds normal  Findings on exam of the body region where surgery is to be performed include:  Left wrist ulna fracture.     Electronically signed by Brigido Almeida MD on 11/10/2022 at 6:39 AM

## 2022-11-10 NOTE — PROGRESS NOTES
Pt awake and alert at this time. Pt on RA, and VSS. Pain being managed-see MAR, denies nausea, tolerating PO. Skin warm , palpable pulses and able to wiggle left fingers. Pt meets criteria to be discharged from Phase 1.

## 2022-11-10 NOTE — PROGRESS NOTES
Discharge instructions review with patient and friend. All home medications have been reviewed, pt v/u. Discharge instructions signed. Pt discharged via wheelchair. Pt discharged with discharge paperwork, ice pack, and all belongings. Friend taking stable pt home.

## 2022-11-10 NOTE — ANESTHESIA PRE PROCEDURE
Department of Anesthesiology  Preprocedure Note       Name:  Estella Sutton   Age:  38 y.o.  :  1980                                          MRN:  [id number]         Date:  11/10/2022      Surgeon: Nadya Solis):  Bhumi Menon MD    Procedure: Procedure(s):  OPEN REDUCTION INTERNAL FIXATION LEFT DISTAL ULNA-TAE    Medications prior to admission:   Prior to Admission medications    Medication Sig Start Date End Date Taking? Authorizing Provider   amoxicillin-clavulanate (AUGMENTIN) 875-125 MG per tablet Take 1 tablet by mouth 2 times daily for 10 days 22  Bhumi Menon MD   HYDROcodone-acetaminophen (NORCO) 5-325 MG per tablet Take 1 tablet by mouth every 6 hours as needed for Pain for up to 5 days. 22  Bhumi Menon MD   amoxicillin-clavulanate (AUGMENTIN) 875-125 MG per tablet Take 1 tablet by mouth 2 times daily for 14 days 11/1/22 11/15/22  Bhumi Menon MD   Multiple Vitamins-Minerals (THERAPEUTIC MULTIVITAMIN-MINERALS) tablet Take 1 tablet by mouth daily    Historical Provider, MD   Cholecalciferol (VITAMIN D) 50 MCG (2000 UT) CAPS capsule Take by mouth Unsure of dosage    Historical Provider, MD   ibuprofen (ADVIL;MOTRIN) 200 MG tablet Take 400 mg by mouth every 6 hours as needed for Pain    Historical Provider, MD       Current medications:    Current Facility-Administered Medications   Medication Dose Route Frequency Provider Last Rate Last Admin    ceFAZolin (ANCEF) 2,000 mg in dextrose 5 % 50 mL IVPB (mini-bag)  2,000 mg IntraVENous On Call to [address] Satinder Garcia MD        0.9 % sodium chloride infusion   IntraVENous Continuous Nikos Spine Talon Gleason MD           Allergies:     Allergies   Allergen Reactions    No Known Allergies      2017 - 17, 9/11/17 10/12/2016 - ls 2016 -  2016 - ls 2016 -  2016 -  2016 -  2016 - ls 2016 - ls 2016 - ls 2015 -  10/26/2015 -  09/15/2015 -         Problem List:    Patient Active Problem List   Diagnosis Code    Current smoker F17.200    Rosacea L71.9    Bronchospasm, acute J98.01    Pyelonephritis N12    Gestational diabetes mellitus treated with oral hypoglycemic therapy O24.415    High-risk pregnancy in third trimester O09.93    Obesity (BMI 30.0-34. 9) E66.9    Mixed hyperlipidemia E78.2    Closed displaced fracture of proximal phalanx of right little finger S62.616A    Open displaced comminuted fracture of shaft of left ulna, type IIIA, IIIB, or IIIC S52.252C    Closed fracture of lower end of left ulna S52.602A       Past Medical History:        Diagnosis Date    Anxiety     Closed displaced fracture of proximal phalanx of right little finger 2021    Diabetes mellitus ([facility] [address]     GDM-glyburide    Mixed hyperlipidemia 2020    Mixed hyperlipidemia     Pyelonephritis 2016    Severe obesity (BMI 35.0-39. 9) with comorbidity ([address] 2020       Past Surgical History:        Procedure Laterality Date    COLONOSCOPY  2022    Dr. Mars Johnson    COLONOSCOPY N/A 2022    COLONOSCOPY POLYPECTOMY SNARE/COLD BIOPSY performed by Liang Brown MD at [address] [place] [address] Left 2022    DEBRIDEMENT OF LEFT WRIST OPEN FRACTURE WITH MINI SEBAS performed by Himanshu Uribe MD at [address] [place] [address] History:    Social History     Tobacco Use    Smoking status: Some Days     Packs/day: 0.25     Years: 20.00     Pack years: 5.00     Types: Cigarettes     Last attempt to quit: 2018     Years since quittin.7    Smokeless tobacco: Never    Tobacco comments:     pack a week   Substance Use Topics    Alcohol use: Yes     Comment: weekends                                Ready to quit: Not Answered  Counseling given: Not Answered  Tobacco comments: pack a week      Vital Signs (Current): There were no vitals filed for this visit.                                            BP Readings from Last 3 Encounters:   11/01/22 (!) 118/96   11/01/22 106/64   04/21/22 125/88       NPO Status:                                                                                 BMI:   Wt Readings from Last 3 Encounters:   11/09/22 164 lb (74.4 kg)   11/01/22 164 lb (74.4 kg)   11/01/22 166 lb 0.1 oz (75.3 kg)     There is no height or weight on file to calculate BMI.    CBC:   Lab Results   Component Value Date/Time    WBC 11.7 12/04/2021 01:40 PM    RBC 4.42 12/04/2021 01:40 PM    HGB 13.1 12/04/2021 01:40 PM    HCT 39.7 12/04/2021 01:40 PM    MCV 89.7 12/04/2021 01:40 PM    RDW 12.5 12/04/2021 01:40 PM     12/04/2021 01:40 PM       CMP:   Lab Results   Component Value Date/Time     12/04/2021 01:40 PM    K 3.9 12/04/2021 01:40 PM     12/04/2021 01:40 PM    CO2 23 12/04/2021 01:40 PM    BUN 11 12/04/2021 01:40 PM    CREATININE 0.6 12/04/2021 01:40 PM    GFRAA >60 12/04/2021 01:40 PM    GFRAA >60 12/01/2012 05:06 PM    AGRATIO 1.6 12/04/2021 01:40 PM    LABGLOM >60 12/04/2021 01:40 PM    GLUCOSE 94 12/04/2021 01:40 PM    PROT 6.7 12/04/2021 01:40 PM    PROT 6.2 10/17/2012 08:29 PM    CALCIUM 9.3 12/04/2021 01:40 PM    BILITOT 0.7 12/04/2021 01:40 PM    ALKPHOS 75 12/04/2021 01:40 PM    AST 16 12/04/2021 01:40 PM    ALT 33 12/04/2021 01:40 PM       POC Tests: No results for input(s): POCGLU, POCNA, POCK, POCCL, POCBUN, POCHEMO, POCHCT in the last 72 hours.     Coags:   Lab Results   Component Value Date/Time    PROTIME 10.1 04/12/2016 01:00 PM    INR 0.89 04/12/2016 01:00 PM    APTT 29.7 04/12/2016 01:00 PM       HCG (If Applicable):   Lab Results   Component Value Date    PREGTESTUR Negative 04/21/2022        ABGs: No results found for: PHART, PO2ART, JCS6UNI, UPP6QMO, BEART, H5VSGRXU     Type & Screen (If Applicable):  No results found for: LABABO, LABRH    Drug/Infectious Status (If Applicable):  No results found for: HIV, HEPCAB    COVID-19 Screening (If Applicable): No results found for: COVID19        Anesthesia Evaluation    Airway: Mallampati: II  TM distance: >3 FB   Neck ROM: full  Mouth opening: > = 3 FB   Dental:          Pulmonary:                              Cardiovascular:            Rhythm: regular  Rate: normal                    Neuro/Psych:               GI/Hepatic/Renal:             Endo/Other:    (+) Diabetes, . Abdominal:             Vascular: Other Findings:           Anesthesia Plan      general     ASA 2       Induction: intravenous. Anesthetic plan and risks discussed with patient. Plan discussed with CRNA.                     Clayton Baldwin MD   11/10/2022

## 2022-11-10 NOTE — DISCHARGE INSTRUCTIONS
Post op instruction:  1- D/C home  2- Dx Left wrist ulna fracture. 3- NWB left wrist  4- Elevation surgical site, with ice  5- Keep splint dry and clean  6- F/U in 2 weeks. Nieves Tello MD, 11/10/2022        ANESTHESIA DISCHARGE INSTRUCTIONS    Wear your seatbelt home. You are under the influence of drugs-do not drink alcohol, drive, operate machinery, make any important decisions or sign any legal documents for 24 hours. A responsible adult needs to be with you for 24 hours. You may experience lightheadedness, dizziness, or sleepiness following surgery. Rest at home today- increase activity as tolerated. Progress slowly to a regular diet unless your physician has instructed you otherwise. Drink plenty of water. If persistent nausea and vomiting becomes a problem, call your physician. Coughing, sore throat and muscle aches are other side effects of anesthesia, and should improve with time. Do not drive or operate machinery while taking narcotics. Females of childbearing potential and on hormonal birth control, should use two forms of contraception following procedure if given a medication called sugammadex and/or emend. Additional contraception should be used for 7 days for sugammadex and/or 28 days for emend. These medications have a potential to reduce the effectiveness of hormonal birth control. GENERAL SURGERY DISCHARGE INSTRUCTIONS    Follow your surgeons instructions. Follow up with your surgeon as directed. Observe the operative area for signs of excessive bleeding. If needed apply pressure,elevate if able and contact your surgeon. Observe the operative site for any signs of infection- such as increased pain,redness,fever greater than 101 degrees,swelling, foul odor or drainage. Contact your surgeon if any of these symptoms are present. Keep operative site clean and dry. Do not remove dressing unless instructed to by surgeon. Apply ice as directed.   If unable to urinate once you are at home,  notify your surgeon or go to the Emergency Room. Avoid pulling,pushing or tugging to suture line. If you become short of breath call your doctor or go to the ER. Take medications as directed. Pain medication should be taken with food. Do not drive or operate machinery while taking narcotics. For any problems or question call your surgeon.

## 2022-11-10 NOTE — ANESTHESIA POSTPROCEDURE EVALUATION
Department of Anesthesiology  Postprocedure Note    Patient: Baljeet Ballesteros  MRN: [id number]  YOB: 1980  Date of evaluation: 11/10/2022      Procedure Summary     Date: 11/10/22 Room / Location: [address]    Anesthesia Start: 3740 Anesthesia Stop: 0779    Procedure: OPEN REDUCTION INTERNAL FIXATION LEFT DISTAL ULNA-TAE (Left: Wrist) Diagnosis:       Other closed fracture of distal end of left ulna, initial encounter      (H72.884D LEFT DISTAL ULNA FRACTURE)    Surgeons: Rafal Monzon MD Responsible Provider: Summer Matias MD    Anesthesia Type: general ASA Status: 2          Anesthesia Type: No value filed.     Mariaulisa Phase I: Marialuisa Score: 10    Marialuisa Phase II: Marialuisa Score: 10      Anesthesia Post Evaluation    Patient location during evaluation: PACU  Level of consciousness: awake  Complications: no  Multimodal analgesia pain management approach

## 2022-11-10 NOTE — PROGRESS NOTES
Pt arrived from OR to PACU bay 6. Report received from OR staff. Pt arouses to voice. Surgical dressing/splint in place to let arm. Pt on 4 L simple mask, NSR, VSS. Will continue to monitor.

## 2022-11-10 NOTE — BRIEF OP NOTE
Brief Postoperative Note      Patient: Alex Song  YOB: 1980  MRN: [id number]    Date of Procedure: 11/10/2022    Pre-Op Diagnosis: S52.692A LEFT DISTAL ULNA SHAFT FRACTURE    Post-Op Diagnosis: Same       Procedure(s):  OPEN REDUCTION INTERNAL FIXATION LEFT DISTAL ULNA-TAE    Surgeon(s):  Maurizio Carnes MD    Assistant: Jonathan Lorenzo CNP    Anesthesia: General    Estimated Blood Loss (mL): Minimal    Complications: None    Specimens:   * No specimens in log *    Implants:  Implant Name Type Inv. Item Serial No.  Lot No. LRB No. Used Action   SCREW BNE L12MM OD27MM ST RENE FULL THRD T8 DR - [id number]  SCREW BNE L12MM OD27MM ST RENE FULL THRD T8 DRV  [employer]  Left 2 Implanted   SCREW BNE L12MM OD27MM ST FULL THRD T8 DR - [id number]  SCREW BNE L12MM OD27MM ST FULL THRD T8 DRV  [employer]WD  Left 3 Implanted   K WIRE FIX L160MM DIA1. 1MM FOR VARIAX DST RAD RENE PLT SYS (PACK OF 10 MIN ORDER) - [id number]  K WIRE FIX L160MM DIA1. 1MM FOR VARIAX DST RAD RENE PLT SYS (PACK OF 10 MIN ORDER)  [employer] [facility]-  Left 1 Implanted   distal ulna plate      Left 1 Implanted         Drains: * No LDAs found *    Findings: Same.     Electronically signed by Maurizio Carnes MD on 11/10/2022 at 4:46 PM

## 2022-11-10 NOTE — PROGRESS NOTES
Pt's BP still remain 169/108-pt still has complaints of extreme pain.  Will continue to medicate per orders

## 2022-11-11 NOTE — OP NOTE
[facility]                     [facility], [address]                                OPERATIVE REPORT    PATIENT NAME: Cori Canada                   :        1980  MED REC NO:   [id number]                          ROOM:  ACCOUNT NO:   [de-identified]                           ADMIT DATE: 11/10/2022  PROVIDER:     Janine Pack MD    DATE OF PROCEDURE:  11/10/2022    PRIMARY CARE PROVIDER:  Niraj Beyer CNP. PREOPERATIVE DIAGNOSES:  Left distal ulnar shaft severely comminuted,  displaced open fracture/dog bite. POSTOPERATIVE DIAGNOSES:  Left distal ulnar shaft severely comminuted,  displaced open fracture/dog bite. OPERATION PERFORMED:  Open treatment of left distal ulnar shaft  comminuted fracture with open reduction and internal fixation. SURGEON:  Janine Pack MD.    ASSISTANT:  Errol Black CNP. ANESTHESIA:  General anesthesia. ESTIMATED BLOOD LOSS:  Minimal.    COMPLICATIONS:  None. TOURNIQUET:  Left upper arm, 250 mmHg. IMPLANTS USED:  [place] Titanium distal ulnar VariAx locking plate with  a combination of locking and nonlocking screws. INDICATIONS:  This is a 74-year-old white female, right-hand dominant,  who was attacked by her dog 10 days ago with a grade 3 open distal  radius and ulnar fracture. At that time, she underwent debridement of  open fracture and all the open wounds. She was seen in the office xray showed  more displaced fracture of the distal ulnar shaft comminuted fracture. We recommended surgical fixation. All risks, benefits, and alternatives  were discussed with the patient, and she agreed to proceed with the  surgical fixation. Given the patient's Body mass index is 32.42 kg/m². And comminuted fracture added significant challenge to the procedure. It required significant physical and mental effort. It required 80% more time for such procedure.      OPERATIVE PROCEDURE:  The patient's left wrist was marked. She received  2 gm Ancef IV preoperatively. The patient was then brought to the  operating room and underwent general anesthesia. A well-padded  tourniquet was placed to the left upper arm. The left upper extremity  was then prepped and draped in a regular sterile routine fashion. A  time-out was called, confirming the patient name, site, and procedure. Esmarch was used for exsanguination and tourniquet was inflated to 250  mmHg. An incision was made over the ulnar aspect of the distal ulnar  shaft area. Careful dissection was performed identifying and protecting  the extensor and flexor tendons. The fracture was then explored and  found to be markedly displaced, severely comminuted fracture. It was  significantly challenging physically and mentally very difficult given  the instability of the fracture. We carefully were able to manipulate  the fracture and able to reduce it anatomically and secured with a  K-wire. White maintaining the reduction, we used a Dirk titanium  distal ulnar locking plate, which was placed in the appropriate  position. We put two screws in the shaft to buttress the fracture. As  the fracture was more volar, so we put the plate in the volar aspect to  buttress the fracture. We then confirmed that with a mini C-arm. We  then added two more locking screws distally to secure the distal  fragment in place. Overall, we were very satisfied with the anatomic reduction and  restoration of the distal radioulnar joint. At this point, we let the  tourniquet down and hemostasis was secured. We irrigated the incision  copiously with normal saline mixed with gentamicin. We then closed the  subcu with a 3-0 Vicryl and the skin with a 4-0 Monocryl. Steri-Strips  were then applied. Dressing was then applied in the form of Xeroform,  4x4's, sterile Webril, and a volar splint was applied.     The patient tolerated the procedure well and was taken to the recovery  in stable condition. Raghav Rapp CNP was 1st Assist given the nature of the procedure that needed advanced assistance. She assisted in all aspect of the procedure, including but limited to draping in a sterile fashion, exposure of surgical area, controlling bleeding, retracting and protecting important structures, achieve and maintain bone reduction and surgical wound closure with dressing application. POSTOPERATIVE PLAN:  The patient will be discharged home. She is  nonweightbearing on the left wrist, but we can start range of motion in  two weeks. She will continue on her p.o. antibiotics for another 10  days.         Jason James MD    D: 11/10/2022 16:52:57       T: 11/11/2022 4:50:32     [email or web address]  Job#: [id number]     Doc#: [id number]    CC:  Cathy Manning

## 2022-11-23 ENCOUNTER — OFFICE VISIT (OUTPATIENT)
Dept: ORTHOPEDIC SURGERY | Age: 42
End: 2022-11-23

## 2022-11-23 VITALS — HEIGHT: 64 IN | BODY MASS INDEX: 28.34 KG/M2 | WEIGHT: 166 LBS

## 2022-11-23 DIAGNOSIS — Z98.890 STATUS POST WRIST SURGERY: ICD-10-CM

## 2022-11-23 DIAGNOSIS — S62.616A CLOSED DISPLACED FRACTURE OF PROXIMAL PHALANX OF RIGHT LITTLE FINGER, INITIAL ENCOUNTER: ICD-10-CM
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
## 2022-11-23 DIAGNOSIS — S52.692A OTHER CLOSED FRACTURE OF DISTAL END OF LEFT ULNA, INITIAL ENCOUNTER: Primary | ICD-10-CM

## 2022-11-23 DIAGNOSIS — S52.592E: ICD-10-CM

## 2022-11-23 PROCEDURE — 99024 POSTOP FOLLOW-UP VISIT: CPT | Performed by: NURSE PRACTITIONER

## 2022-11-23 PROCEDURE — APPNB30 APP NON BILLABLE TIME 0-30 MINS: Performed by: NURSE PRACTITIONER

## 2022-11-24 NOTE — PROGRESS NOTES
DIAGNOSIS:    1-Left distal ulna severely comminuted displaced open fracture/dog bite, status post ORIF. 2-Left distal radius fracture    DATE OF SURGERY:  11/10/2022. HISTORY OF PRESENT ILLNESS:  Ms. Godwin Nuno 38 y.o.  female right handed returns today  for 2 weeks postoperative visit. The patient denies any significant pain in the left wrist.  Rates pain a 1-2/10 VAS mild, aching, intermittent and are improving. Aggravating factors movement. Alleviating factors rest.  No numbness or tingling sensation. No fever or Chills. She  is in a splint. She is a smoker. PHYSICAL EXAMINATION:  The incision is completely healed. The dog bite wounds are scabbed and healing. Suture intact. No signs of any erythema or drainage. She  has mild pain with the active or passive range of motion of the left wrist, but decrease ROM. She  has intact sensation, distally, and she  is neurovascularly intact. IMAGING:  Three views left wrist taken today in the office showed anatomic alignment of left distal ulna, plate and screws in good position, no loosening. There is a distal radius minimally displaced fracture. IMPRESSION:  2 weeks out from   1-Left distal ulna severely comminuted displaced open fracture/dog bite, status post ORIF. 2-Left distal radius fracture    PLAN:  I have told the patient to work on ROM, as well as strengthening exercises. A removable forearm brace applied. Suture removed today with the patient permission, tolerated well. Antibiotics. Dressing applied today and instructed to keep wounds clean and dry. No heavy impact activities. The patient will come back for a follow up in 6 weeks. At that time, we will take 3 views of the left wrist. PT if needed then. The patient smokes cigarettes, and we discussed with the patient the risks of smoking on general health and also on bone and soft tissue healing (delay and non-union), and promised to cut down or stop smoking.      Smoking: Educated the patient regarding the hazards of smoking and that it harms their body in many ways. It increases the chance of developing heart disease, lung disease, cancer, and other health problems including poor bone and wound healing. The importance of smoking cessation for optimal bone and wound healing was stressed. This was communicated verbally, 5 Minutes. As this patient has demonstrated risk factors for osteoporosis, such as age greater than [de-identified] years and evidence of a fracture, I have referred the patient back to the primary care physician for evaluation for osteoporosis, including consideration for DEXA scanning, if this is felt to be clinically indicated. The patient is advised to contact the primary care physician to follow-up for further evaluation.              Aurea Carvajal, APRN - CNP

## 2023-01-04 ENCOUNTER — OFFICE VISIT (OUTPATIENT)
Dept: ORTHOPEDIC SURGERY | Age: 43
End: 2023-01-04

## 2023-01-04 VITALS — WEIGHT: 166 LBS | HEIGHT: 64 IN | BODY MASS INDEX: 28.34 KG/M2

## 2023-01-04 DIAGNOSIS — S52.692A OTHER CLOSED FRACTURE OF DISTAL END OF LEFT ULNA, INITIAL ENCOUNTER: Primary | ICD-10-CM

## 2023-01-04 PROCEDURE — 99024 POSTOP FOLLOW-UP VISIT: CPT | Performed by: NURSE PRACTITIONER

## 2023-01-04 PROCEDURE — APPNB15 APP NON BILLABLE TIME 0-15 MINS: Performed by: NURSE PRACTITIONER

## 2023-01-04 NOTE — PROGRESS NOTES
DIAGNOSIS:    1-Left distal ulna severely comminuted displaced open fracture/dog bite, status post ORIF. 2-Left distal radius fracture    DATE OF SURGERY:  11/10/2022. HISTORY OF PRESENT ILLNESS:  Ms. Orville Kay 38 y.o.  female right handed returns today  for 8 weeks postoperative visit. The patient denies any significant pain in the left wrist.  Rates pain a 0/10 VAS and is doing much better. No numbness or tingling sensation. No fever or Chills. She  is in a brace. .She is a smoker. PHYSICAL EXAMINATION:  The incision is completely healed. The dog bite wounds are healed well. No signs of any erythema or drainage. She  has minimal pain with the active or passive range of motion of the left wrist, but decrease ROM. She  has intact sensation, distally, and she  is neurovascularly intact. IMAGING:  Three views left wrist taken today in the office showed anatomic alignment of left distal ulna, plate and screws in good position, no loosening. There is a distal radius minimally displaced fracture. IMPRESSION:  8 weeks out from   1-Left distal ulna severely comminuted displaced open fracture/dog bite, status post ORIF. 2-Left distal radius fracture    PLAN:  I have told the patient to work on ROM, as well as strengthening exercises. She would like to do it on her own. She can discontinue the forearm brace. No heavy impact activities. The patient will come back for a follow up in 6 weeks. At that time, we will take 3 views of the left wrist. PT if needed then. The patient smokes cigarettes, and we discussed with the patient the risks of smoking on general health and also on bone and soft tissue healing (delay and non-union), and promised to cut down or stop smoking. Smoking: Educated the patient regarding the hazards of smoking and that it harms their body in many ways.  It increases the chance of developing heart disease, lung disease, cancer, and other health problems including poor bone and wound healing. The importance of smoking cessation for optimal bone and wound healing was stressed. This was communicated verbally, 5 Minutes. As this patient has demonstrated risk factors for osteoporosis, such as age greater than [de-identified] years and evidence of a fracture, I have referred the patient back to the primary care physician for evaluation for osteoporosis, including consideration for DEXA scanning, if this is felt to be clinically indicated. The patient is advised to contact the primary care physician to follow-up for further evaluation.              Jessica Gutierrez, APRN - CNP

## 2023-02-22 ENCOUNTER — OFFICE VISIT (OUTPATIENT)
Dept: ORTHOPEDIC SURGERY | Age: 43
End: 2023-02-22

## 2023-02-22 VITALS — BODY MASS INDEX: 28.34 KG/M2 | HEIGHT: 64 IN | WEIGHT: 166 LBS

## 2023-02-22 DIAGNOSIS — Z98.890 STATUS POST WRIST SURGERY: ICD-10-CM

## 2023-02-22 DIAGNOSIS — S52.692A OTHER CLOSED FRACTURE OF DISTAL END OF LEFT ULNA, INITIAL ENCOUNTER: Primary | ICD-10-CM

## 2023-02-22 NOTE — PROGRESS NOTES
DIAGNOSIS:    1-Left distal ulna severely comminuted displaced open fracture/dog bite, status post ORIF.  2-Left distal radius fracture    DATE OF SURGERY:  11/10/2022.    HISTORY OF PRESENT ILLNESS:  Ksenia Dotson 43 y.o.  female right handed returns today  for 3 months postoperative visit.  The patient denies any significant pain in the left wrist.  Rates pain a 0/10 VAS and is doing much better.  Still feels some stiffness.  She has mild numbness or tingling sensation in the baby finger, which is starting to improve. No fever or Chills. She  is in a brace. She is a smoker.     Past Medical History:   Diagnosis Date    Anxiety     Closed displaced fracture of proximal phalanx of right little finger 2021    Diabetes mellitus (HCC)     GDM-glyburide    Mixed hyperlipidemia 2020    Mixed hyperlipidemia     Pyelonephritis 2016    Severe obesity (BMI 35.0-39.9) with comorbidity (HCC) 2020       Past Surgical History:   Procedure Laterality Date    COLONOSCOPY  2022    Dr. Elijah Layton    COLONOSCOPY N/A 2022    COLONOSCOPY POLYPECTOMY SNARE/COLD BIOPSY performed by Elijah Layton MD at [facility] ENDOSCOPY    WISDOM TOOTH EXTRACTION      WRIST FRACTURE SURGERY Left 2022    DEBRIDEMENT OF LEFT WRIST OPEN FRACTURE WITH MINI SEBAS performed by Wilma Corcoran MD at [facility] OR    WRIST FRACTURE SURGERY Left 11/10/2022    OPEN REDUCTION INTERNAL FIXATION LEFT DISTAL ULNA-TAE performed by Wilma Corcoran MD at [facility] OR       Social History     Socioeconomic History    Marital status:      Spouse name: Not on file    Number of children: Not on file    Years of education: Not on file    Highest education level: Not on file   Occupational History    Not on file   Tobacco Use    Smoking status: Some Days     Packs/day: 0.25     Years: 20.00     Pack years: 5.00     Types: Cigarettes     Last attempt to quit: 2018     Years since quittin.0    Smokeless  tobacco: Never    Tobacco comments:     pack a week   Vaping Use    Vaping Use: Never used   Substance and Sexual Activity    Alcohol use: Yes     Comment: weekends    Drug use: No    Sexual activity: Yes     Partners: Male   Other Topics Concern    Not on file   Social History Narrative    Not on file     Social Determinants of Health     Financial Resource Strain: Not on file   Food Insecurity: Not on file   Transportation Needs: Not on file   Physical Activity: Not on file   Stress: Not on file   Social Connections: Not on file   Intimate Partner Violence: Not on file   Housing Stability: Not on file       Family History   Problem Relation Age of Onset    Cancer Paternal Grandfather         lung    Diabetes Paternal Grandfather     Diabetes Maternal Cousin     High Blood Pressure Mother     High Cholesterol Mother     Osteoporosis Mother     High Blood Pressure Father     Diabetes Father     Alcohol Abuse Brother     Osteoporosis Maternal Grandmother     Early Death Maternal Grandfather     Cancer Maternal Grandfather        Current Outpatient Medications on File Prior to Visit   Medication Sig Dispense Refill    Calcium 500-100 MG-UNIT CHEW Take 1 tablet by mouth      Multiple Vitamins-Minerals (THERAPEUTIC MULTIVITAMIN-MINERALS) tablet Take 1 tablet by mouth daily      Cholecalciferol (VITAMIN D) 50 MCG (2000 UT) CAPS capsule Take by mouth Unsure of dosage      ibuprofen (ADVIL;MOTRIN) 200 MG tablet Take 400 mg by mouth every 6 hours as needed for Pain       No current facility-administered medications on file prior to visit. Pertinent items are noted in HPI  Review of systems reviewed from Patient History Form and available in the patient's chart under the Media tab. No change noted. PHYSICAL EXAMINATION:  Ms. Natividad Concepcion is a very pleasant 41 y.o.  female who presents today in no acute distress, awake, alert, and oriented. She is well dressed, nourished and  groomed.   Patient with normal affect. Height is  5' 4\" (1.626 m), weight is 166 lb (75.3 kg), Body mass index is 28.49 kg/m². Resting respiratory rate is 16. The patient walks with no limp. The incision is completely healed. The dog bite wounds are healed well. No signs of any erythema or drainage. She  has no pain with the active or passive range of motion of the left wrist, but decrease ROM with extension. She  has intact sensation, distally, and she  is neurovascularly intact. Good strength, and no instability both upper and lower extremities. IMAGING:  Three views left wrist taken today in the office showed anatomic alignment of left distal ulna, plate and screws in good position, no loosening. There is a distal radius minimally displaced fracture. IMPRESSION: 3 months out from   1-Left distal ulna severely comminuted displaced open fracture/dog bite, status post ORIF. 2-Left distal radius fracture    PLAN:  I have told the patient to work on ROM, as well as strengthening exercises. She would like to do it on her own. She can go back to normal activity with no restrictions. She follow-up in 3 months and will repeat x-ray at that time. I told the patient that it is not unusual to have some achy pain and swelling for up to a year after a fracture.       Bhumi Menon MD

## 2023-05-04 ENCOUNTER — HOSPITAL ENCOUNTER (OUTPATIENT)
Dept: WOMENS IMAGING | Age: 43
Discharge: HOME | End: 2023-05-04
Payer: COMMERCIAL

## 2023-05-04 DIAGNOSIS — Z12.31 VISIT FOR SCREENING MAMMOGRAM: ICD-10-CM

## 2023-05-04 PROCEDURE — 77063 BREAST TOMOSYNTHESIS BI: CPT

## 2023-09-21 ENCOUNTER — TRANSCRIBE ORDERS (OUTPATIENT)
Dept: ADMINISTRATIVE | Age: 43
End: 2023-09-21

## 2023-09-21 DIAGNOSIS — N20.0 KIDNEY CALCULUS: ICD-10-CM

## 2023-09-21 DIAGNOSIS — N39.0 URINARY TRACT INFECTION WITHOUT HEMATURIA, SITE UNSPECIFIED: Primary | ICD-10-CM

## 2023-09-25 ENCOUNTER — HOSPITAL ENCOUNTER (OUTPATIENT)
Dept: CT IMAGING | Age: 43
Discharge: HOME | End: 2023-09-25
Attending: UROLOGY
Payer: COMMERCIAL

## 2023-09-25 DIAGNOSIS — N20.0 KIDNEY CALCULUS: ICD-10-CM

## 2023-09-25 DIAGNOSIS — N39.0 URINARY TRACT INFECTION WITHOUT HEMATURIA, SITE UNSPECIFIED: ICD-10-CM

## 2023-09-25 PROCEDURE — 74176 CT ABD & PELVIS W/O CONTRAST: CPT

## (undated) DEVICE — C-ARM: Brand: UNBRANDED

## (undated) DEVICE — BANDAGE COMPR W4INXL12FT E DISP ESMARCH EVEN

## (undated) DEVICE — DRESSING,GAUZE,XEROFORM,CURAD,1"X8",ST: Brand: CURAD

## (undated) DEVICE — MEDICINE CUP, GRADUATED, STER: Brand: MEDLINE

## (undated) DEVICE — GLOVE SURG SZ 6 THK91MIL LTX FREE SYN POLYISOPRENE ANTI

## (undated) DEVICE — STRIP,CLOSURE,WOUND,MEDI-STRIP,1/2X4: Brand: MEDLINE

## (undated) DEVICE — GLOVE ORTHO 8   MSG9480

## (undated) DEVICE — PADDING CAST W4INXL4YD SPUN DACRON POLY POR SYN VERSATILE

## (undated) DEVICE — DRILL, AO, SCALED: Brand: VARIAX

## (undated) DEVICE — PADDING CAST W4INXL4YD ST COT RAYON MICROPLEATED HIGHLY

## (undated) DEVICE — GLOVE SURG SZ 6 L12IN THK75MIL DK GRN LTX FREE

## (undated) DEVICE — GLOVE ORANGE PI 8   MSG9080

## (undated) DEVICE — APPLICATOR MEDICATED 26 CC SOLUTION HI LT ORNG CHLORAPREP

## (undated) DEVICE — ELECTRODE PT RET AD L9FT HI MOIST COND ADH HYDRGEL CORDED

## (undated) DEVICE — DRAPE HND W114XL142IN BLU POLYPR W O PCH FEN CRD AND TB HLDR

## (undated) DEVICE — STERILE VELCLOSE ELASTIC BANDAGE, 4IN: Brand: VELCLOSE

## (undated) DEVICE — SOLUTION IV IRRIG 500ML 0.9% SODIUM CHL 2F7123

## (undated) DEVICE — TOWEL,OR,DSP,ST,BLUE,STD,4/PK,20PK/CS: Brand: MEDLINE

## (undated) DEVICE — INTENDED FOR TISSUE SEPARATION, AND OTHER PROCEDURES THAT REQUIRE A SHARP SURGICAL BLADE TO PUNCTURE OR CUT.: Brand: BARD-PARKER ® STAINLESS STEEL BLADES

## (undated) DEVICE — ZIMMER® STERILE DISPOSABLE TOURNIQUET CUFF WITH PLC, DUAL PORT, SINGLE BLADDER, 18 IN. (46 CM)

## (undated) DEVICE — SUTURE VCRL + SZ 3-0 L18IN ABSRB UD SH 1/2 CIR TAPERCUT NDL VCP864D

## (undated) DEVICE — 3M™ STERI-STRIP™ REINFORCED ADHESIVE SKIN CLOSURES, R1547, 1/2 IN X 4 IN (12 MM X 100 MM), 6 STRIPS/ENVELOPE: Brand: 3M™ STERI-STRIP™

## (undated) DEVICE — UNTHREADED GUIDE WIRE: Brand: FIXOS

## (undated) DEVICE — HYPODERMIC SAFETY NEEDLE: Brand: MAGELLAN

## (undated) DEVICE — SYRINGE IRRIG 60ML SFT PLIABLE BLB EZ TO GRP 1 HND USE W/

## (undated) DEVICE — GAUZE,SPONGE,4"X4",8PLY,STRL,LF,10/TRAY: Brand: MEDLINE

## (undated) DEVICE — DRESSING,GAUZE,XEROFORM,CURAD,5"X9",ST: Brand: CURAD

## (undated) DEVICE — SYRINGE 60ML BULB IRRIG ST LF

## (undated) DEVICE — PAD ABSRB W8XL10IN ABD HYDROPHOBIC NONWOVEN THCK LAYR CELOS

## (undated) DEVICE — MASTISOL ADHESIVE LIQ 2/3ML

## (undated) DEVICE — ADHESIVE LIQ 2OZ ADJUNCT FOR DSG MASTISOL

## (undated) DEVICE — FORCEPS BX 240CM 2.4MM L NDL RAD JAW 4 M00513334

## (undated) DEVICE — PACK PROCEDURE SURG EXTREMITY MFFOP CUST

## (undated) DEVICE — SUTURE MCRYL + SZ 4-0 L27IN ABSRB UD L19MM PS-2 3/8 CIR MCP426H

## (undated) DEVICE — TRAY PREP DRY W/ PREM GLV 2 APPL 6 SPNG 2 UNDPD 1 OVERWRAP